# Patient Record
Sex: FEMALE | Race: WHITE | NOT HISPANIC OR LATINO | URBAN - METROPOLITAN AREA
[De-identification: names, ages, dates, MRNs, and addresses within clinical notes are randomized per-mention and may not be internally consistent; named-entity substitution may affect disease eponyms.]

---

## 2022-09-24 ENCOUNTER — INPATIENT (INPATIENT)
Facility: HOSPITAL | Age: 62
LOS: 3 days | Discharge: HOME | DRG: 419 | End: 2022-09-28
Attending: HOSPITALIST | Admitting: INTERNAL MEDICINE
Payer: COMMERCIAL

## 2022-09-24 VITALS
DIASTOLIC BLOOD PRESSURE: 77 MMHG | TEMPERATURE: 98 F | HEIGHT: 70 IN | OXYGEN SATURATION: 98 % | HEART RATE: 84 BPM | RESPIRATION RATE: 24 BRPM | SYSTOLIC BLOOD PRESSURE: 169 MMHG

## 2022-09-24 LAB
ALBUMIN SERPL ELPH-MCNC: 4.7 G/DL — SIGNIFICANT CHANGE UP (ref 3.3–5.2)
ALP SERPL-CCNC: 239 U/L — HIGH (ref 40–120)
ALT FLD-CCNC: 326 U/L — HIGH
ANION GAP SERPL CALC-SCNC: 18 MMOL/L — HIGH (ref 5–17)
APPEARANCE UR: CLEAR — SIGNIFICANT CHANGE UP
AST SERPL-CCNC: 100 U/L — HIGH
BACTERIA # UR AUTO: ABNORMAL
BASOPHILS # BLD AUTO: 0.05 K/UL — SIGNIFICANT CHANGE UP (ref 0–0.2)
BASOPHILS NFR BLD AUTO: 0.4 % — SIGNIFICANT CHANGE UP (ref 0–2)
BILIRUB SERPL-MCNC: 1.9 MG/DL — SIGNIFICANT CHANGE UP (ref 0.4–2)
BILIRUB UR-MCNC: NEGATIVE — SIGNIFICANT CHANGE UP
BLD GP AB SCN SERPL QL: SIGNIFICANT CHANGE UP
BUN SERPL-MCNC: 13.3 MG/DL — SIGNIFICANT CHANGE UP (ref 8–20)
CALCIUM SERPL-MCNC: 10.5 MG/DL — SIGNIFICANT CHANGE UP (ref 8.4–10.5)
CHLORIDE SERPL-SCNC: 100 MMOL/L — SIGNIFICANT CHANGE UP (ref 98–107)
CO2 SERPL-SCNC: 22 MMOL/L — SIGNIFICANT CHANGE UP (ref 22–29)
COLOR SPEC: YELLOW — SIGNIFICANT CHANGE UP
CREAT SERPL-MCNC: 0.88 MG/DL — SIGNIFICANT CHANGE UP (ref 0.5–1.3)
DIFF PNL FLD: ABNORMAL
EGFR: 74 ML/MIN/1.73M2 — SIGNIFICANT CHANGE UP
EOSINOPHIL # BLD AUTO: 0.07 K/UL — SIGNIFICANT CHANGE UP (ref 0–0.5)
EOSINOPHIL NFR BLD AUTO: 0.6 % — SIGNIFICANT CHANGE UP (ref 0–6)
EPI CELLS # UR: SIGNIFICANT CHANGE UP
FLUAV AG NPH QL: SIGNIFICANT CHANGE UP
FLUBV AG NPH QL: SIGNIFICANT CHANGE UP
GLUCOSE SERPL-MCNC: 98 MG/DL — SIGNIFICANT CHANGE UP (ref 70–99)
GLUCOSE UR QL: NEGATIVE MG/DL — SIGNIFICANT CHANGE UP
HCT VFR BLD CALC: 46 % — HIGH (ref 34.5–45)
HGB BLD-MCNC: 15.8 G/DL — HIGH (ref 11.5–15.5)
HIV 1 & 2 AB SERPL IA.RAPID: SIGNIFICANT CHANGE UP
IMM GRANULOCYTES NFR BLD AUTO: 0.4 % — SIGNIFICANT CHANGE UP (ref 0–0.9)
INR BLD: 1.06 RATIO — SIGNIFICANT CHANGE UP (ref 0.88–1.16)
KETONES UR-MCNC: ABNORMAL
LACTATE BLDV-MCNC: 1.2 MMOL/L — SIGNIFICANT CHANGE UP (ref 0.5–2)
LEUKOCYTE ESTERASE UR-ACNC: NEGATIVE — SIGNIFICANT CHANGE UP
LIDOCAIN IGE QN: 52 U/L — HIGH (ref 22–51)
LYMPHOCYTES # BLD AUTO: 1.36 K/UL — SIGNIFICANT CHANGE UP (ref 1–3.3)
LYMPHOCYTES # BLD AUTO: 12 % — LOW (ref 13–44)
MCHC RBC-ENTMCNC: 31.3 PG — SIGNIFICANT CHANGE UP (ref 27–34)
MCHC RBC-ENTMCNC: 34.3 GM/DL — SIGNIFICANT CHANGE UP (ref 32–36)
MCV RBC AUTO: 91.1 FL — SIGNIFICANT CHANGE UP (ref 80–100)
MONOCYTES # BLD AUTO: 1.14 K/UL — HIGH (ref 0–0.9)
MONOCYTES NFR BLD AUTO: 10.1 % — SIGNIFICANT CHANGE UP (ref 2–14)
NEUTROPHILS # BLD AUTO: 8.65 K/UL — HIGH (ref 1.8–7.4)
NEUTROPHILS NFR BLD AUTO: 76.5 % — SIGNIFICANT CHANGE UP (ref 43–77)
NITRITE UR-MCNC: NEGATIVE — SIGNIFICANT CHANGE UP
PH UR: 5 — SIGNIFICANT CHANGE UP (ref 5–8)
PLATELET # BLD AUTO: 238 K/UL — SIGNIFICANT CHANGE UP (ref 150–400)
POTASSIUM SERPL-MCNC: 3.8 MMOL/L — SIGNIFICANT CHANGE UP (ref 3.5–5.3)
POTASSIUM SERPL-SCNC: 3.8 MMOL/L — SIGNIFICANT CHANGE UP (ref 3.5–5.3)
PROT SERPL-MCNC: 8 G/DL — SIGNIFICANT CHANGE UP (ref 6.6–8.7)
PROT UR-MCNC: NEGATIVE — SIGNIFICANT CHANGE UP
PROTHROM AB SERPL-ACNC: 12.3 SEC — SIGNIFICANT CHANGE UP (ref 10.5–13.4)
RBC # BLD: 5.05 M/UL — SIGNIFICANT CHANGE UP (ref 3.8–5.2)
RBC # FLD: 12.8 % — SIGNIFICANT CHANGE UP (ref 10.3–14.5)
RBC CASTS # UR COMP ASSIST: ABNORMAL /HPF (ref 0–4)
RSV RNA NPH QL NAA+NON-PROBE: SIGNIFICANT CHANGE UP
SARS-COV-2 RNA SPEC QL NAA+PROBE: SIGNIFICANT CHANGE UP
SODIUM SERPL-SCNC: 140 MMOL/L — SIGNIFICANT CHANGE UP (ref 135–145)
SP GR SPEC: 1.02 — SIGNIFICANT CHANGE UP (ref 1.01–1.02)
UROBILINOGEN FLD QL: 8 MG/DL
WBC # BLD: 11.31 K/UL — HIGH (ref 3.8–10.5)
WBC # FLD AUTO: 11.31 K/UL — HIGH (ref 3.8–10.5)
WBC UR QL: NEGATIVE /HPF — SIGNIFICANT CHANGE UP (ref 0–5)

## 2022-09-24 PROCEDURE — 99284 EMERGENCY DEPT VISIT MOD MDM: CPT

## 2022-09-24 PROCEDURE — 93010 ELECTROCARDIOGRAM REPORT: CPT

## 2022-09-24 PROCEDURE — 76705 ECHO EXAM OF ABDOMEN: CPT | Mod: 26,59

## 2022-09-24 RX ORDER — HYDROMORPHONE HYDROCHLORIDE 2 MG/ML
0.5 INJECTION INTRAMUSCULAR; INTRAVENOUS; SUBCUTANEOUS
Refills: 0 | Status: DISCONTINUED | OUTPATIENT
Start: 2022-09-24 | End: 2022-09-25

## 2022-09-24 RX ORDER — ACETAMINOPHEN 500 MG
650 TABLET ORAL ONCE
Refills: 0 | Status: COMPLETED | OUTPATIENT
Start: 2022-09-24 | End: 2022-09-24

## 2022-09-24 RX ORDER — SODIUM CHLORIDE 9 MG/ML
1000 INJECTION INTRAMUSCULAR; INTRAVENOUS; SUBCUTANEOUS ONCE
Refills: 0 | Status: COMPLETED | OUTPATIENT
Start: 2022-09-24 | End: 2022-09-24

## 2022-09-24 RX ORDER — MEROPENEM 1 G/30ML
1000 INJECTION INTRAVENOUS ONCE
Refills: 0 | Status: DISCONTINUED | OUTPATIENT
Start: 2022-09-24 | End: 2022-09-24

## 2022-09-24 RX ORDER — MEROPENEM 1 G/30ML
1000 INJECTION INTRAVENOUS ONCE
Refills: 0 | Status: COMPLETED | OUTPATIENT
Start: 2022-09-24 | End: 2022-09-24

## 2022-09-24 RX ORDER — KETOROLAC TROMETHAMINE 30 MG/ML
15 SYRINGE (ML) INJECTION ONCE
Refills: 0 | Status: DISCONTINUED | OUTPATIENT
Start: 2022-09-24 | End: 2022-09-24

## 2022-09-24 RX ORDER — FAMOTIDINE 10 MG/ML
20 INJECTION INTRAVENOUS ONCE
Refills: 0 | Status: COMPLETED | OUTPATIENT
Start: 2022-09-24 | End: 2022-09-24

## 2022-09-24 RX ADMIN — Medication 15 MILLIGRAM(S): at 15:39

## 2022-09-24 RX ADMIN — HYDROMORPHONE HYDROCHLORIDE 0.5 MILLIGRAM(S): 2 INJECTION INTRAMUSCULAR; INTRAVENOUS; SUBCUTANEOUS at 15:45

## 2022-09-24 RX ADMIN — MEROPENEM 1000 MILLIGRAM(S): 1 INJECTION INTRAVENOUS at 23:16

## 2022-09-24 RX ADMIN — Medication 650 MILLIGRAM(S): at 23:47

## 2022-09-24 RX ADMIN — FAMOTIDINE 20 MILLIGRAM(S): 10 INJECTION INTRAVENOUS at 15:39

## 2022-09-24 RX ADMIN — SODIUM CHLORIDE 1000 MILLILITER(S): 9 INJECTION INTRAMUSCULAR; INTRAVENOUS; SUBCUTANEOUS at 15:39

## 2022-09-24 NOTE — ED ADULT NURSE NOTE - OBJECTIVE STATEMENT
AAOx3 c/o left "gallbladder" pain today. Patient grimacing in pain. Denies NVD, HA, CP, SOB. Labs sent, IV in place. Meds given.

## 2022-09-24 NOTE — ED ADULT TRIAGE NOTE - CHIEF COMPLAINT QUOTE
62F nad c/o epigastric radiating to back onset 1200 today, states "I am having a gallbladder attacK"

## 2022-09-24 NOTE — ED STATDOCS - PROGRESS NOTE DETAILS
LORENA Randolph: pt reporting improvement in pain after meds. Pain similar to previous "gallbladder attacks". had similar episode 1 week ago, not as severe as today which self resolved after a few hours. LFTs elevated, pending ultrasound read. LORENA Randolph: dilated CBD to 13mm with multiple stones in duct. GI c/s placed. surgery consulted. pain still controlled. Received sign out from LORENA Randolph. Labs and US discussed with pt. Sx saw pt who want admission to medicine, GI will see in morning to see if want to intervene. Pt reassessed. Pain well controlled. Spoke with medicine, will order CT abd/pelvis, abx, and blood cultures. CT results explained to pt. Will admit to medicine for further workup and management.

## 2022-09-24 NOTE — ED STATDOCS - NS_ ATTENDINGSCRIBEDETAILS _ED_A_ED_FT
I, Oscar Scales, performed the initial face to face bedside interview with this patient regarding history of present illness, review of symptoms and relevant past medical, social and family history.  I completed an independent physical examination.  I was the initial provider who evaluated this patient.  The history, relevant review of systems, past medical and surgical history, medical decision making, and physical examination was documented by the scribe in my presence and I attest to the accuracy of the documentation.  I have signed out the follow up of any pending tests (i.e. labs, radiological studies) to the ACP.  I have communicated the patient’s plan of care and disposition with the ACP.

## 2022-09-24 NOTE — ED STATDOCS - OBJECTIVE STATEMENT
63 y/o female with pmhx of biliary colic, c/o sudden onset of upper abdominal pain radiating to both sides of abdomen to back. States pain has been waning and waxing the past 3 days and become worse today which caused her to come to ED. Pt has had nausea. Denies vomiting, fever or chills. Denies trauma. No other symptoms. 63 y/o female with pmhx of biliary colic for the past 3 years ( usually occurs once a year), now c/o sudden onset of upper abdominal pain radiating to both sides of abdomen to back. States pain has been waning and waxing the past 3 days and become worse today which caused her to come to ED. Pt has had nausea. Denies vomiting, fever or chills. Denies trauma. No other symptoms.

## 2022-09-24 NOTE — ED STATDOCS - CLINICAL SUMMARY MEDICAL DECISION MAKING FREE TEXT BOX
Will evaluate for acute biliary colic due to hx of colic. Will obtain US, labs, give pain medication and IV fluids.

## 2022-09-24 NOTE — ED ADULT NURSE REASSESSMENT NOTE - NS ED NURSE REASSESS COMMENT FT1
Assumed care of pt from previous RN. A&Ox4, RR even and unlabored. Skin is warm an dry. PT reports no complaints at this time. Pending GI consult. Updated on plan of care.

## 2022-09-25 ENCOUNTER — TRANSCRIPTION ENCOUNTER (OUTPATIENT)
Age: 62
End: 2022-09-25

## 2022-09-25 DIAGNOSIS — K80.50 CALCULUS OF BILE DUCT WITHOUT CHOLANGITIS OR CHOLECYSTITIS WITHOUT OBSTRUCTION: ICD-10-CM

## 2022-09-25 DIAGNOSIS — Z98.890 OTHER SPECIFIED POSTPROCEDURAL STATES: Chronic | ICD-10-CM

## 2022-09-25 LAB
-  STREPTOCOCCUS SP. (NOT GRP A, B OR S PNEUMONIAE): SIGNIFICANT CHANGE UP
ALBUMIN SERPL ELPH-MCNC: 3.7 G/DL — SIGNIFICANT CHANGE UP (ref 3.3–5.2)
ALP SERPL-CCNC: 216 U/L — HIGH (ref 40–120)
ALT FLD-CCNC: 236 U/L — HIGH
ANION GAP SERPL CALC-SCNC: 16 MMOL/L — SIGNIFICANT CHANGE UP (ref 5–17)
AST SERPL-CCNC: 66 U/L — HIGH
BILIRUB SERPL-MCNC: 0.7 MG/DL — SIGNIFICANT CHANGE UP (ref 0.4–2)
BUN SERPL-MCNC: 14.3 MG/DL — SIGNIFICANT CHANGE UP (ref 8–20)
CALCIUM SERPL-MCNC: 9.2 MG/DL — SIGNIFICANT CHANGE UP (ref 8.4–10.5)
CHLORIDE SERPL-SCNC: 104 MMOL/L — SIGNIFICANT CHANGE UP (ref 98–107)
CO2 SERPL-SCNC: 20 MMOL/L — LOW (ref 22–29)
CREAT SERPL-MCNC: 0.72 MG/DL — SIGNIFICANT CHANGE UP (ref 0.5–1.3)
EGFR: 94 ML/MIN/1.73M2 — SIGNIFICANT CHANGE UP
GLUCOSE SERPL-MCNC: 59 MG/DL — LOW (ref 70–99)
GRAM STN FLD: SIGNIFICANT CHANGE UP
HCT VFR BLD CALC: 40.4 % — SIGNIFICANT CHANGE UP (ref 34.5–45)
HGB BLD-MCNC: 13.6 G/DL — SIGNIFICANT CHANGE UP (ref 11.5–15.5)
MCHC RBC-ENTMCNC: 31.1 PG — SIGNIFICANT CHANGE UP (ref 27–34)
MCHC RBC-ENTMCNC: 33.7 GM/DL — SIGNIFICANT CHANGE UP (ref 32–36)
MCV RBC AUTO: 92.4 FL — SIGNIFICANT CHANGE UP (ref 80–100)
METHOD TYPE: SIGNIFICANT CHANGE UP
PLATELET # BLD AUTO: 194 K/UL — SIGNIFICANT CHANGE UP (ref 150–400)
POTASSIUM SERPL-MCNC: 3.5 MMOL/L — SIGNIFICANT CHANGE UP (ref 3.5–5.3)
POTASSIUM SERPL-SCNC: 3.5 MMOL/L — SIGNIFICANT CHANGE UP (ref 3.5–5.3)
PROT SERPL-MCNC: 6.3 G/DL — LOW (ref 6.6–8.7)
RBC # BLD: 4.37 M/UL — SIGNIFICANT CHANGE UP (ref 3.8–5.2)
RBC # FLD: 12.8 % — SIGNIFICANT CHANGE UP (ref 10.3–14.5)
SODIUM SERPL-SCNC: 140 MMOL/L — SIGNIFICANT CHANGE UP (ref 135–145)
SPECIMEN SOURCE: SIGNIFICANT CHANGE UP
WBC # BLD: 6.29 K/UL — SIGNIFICANT CHANGE UP (ref 3.8–10.5)
WBC # FLD AUTO: 6.29 K/UL — SIGNIFICANT CHANGE UP (ref 3.8–10.5)

## 2022-09-25 PROCEDURE — 74177 CT ABD & PELVIS W/CONTRAST: CPT | Mod: 26,MA

## 2022-09-25 PROCEDURE — 12345: CPT | Mod: NC

## 2022-09-25 PROCEDURE — 99223 1ST HOSP IP/OBS HIGH 75: CPT

## 2022-09-25 RX ORDER — METOPROLOL TARTRATE 50 MG
50 TABLET ORAL DAILY
Refills: 0 | Status: DISCONTINUED | OUTPATIENT
Start: 2022-09-25 | End: 2022-09-27

## 2022-09-25 RX ORDER — AMLODIPINE BESYLATE 2.5 MG/1
1 TABLET ORAL
Qty: 0 | Refills: 0 | DISCHARGE

## 2022-09-25 RX ORDER — ONDANSETRON 8 MG/1
4 TABLET, FILM COATED ORAL EVERY 8 HOURS
Refills: 0 | Status: DISCONTINUED | OUTPATIENT
Start: 2022-09-25 | End: 2022-09-27

## 2022-09-25 RX ORDER — METOPROLOL TARTRATE 50 MG
1 TABLET ORAL
Qty: 0 | Refills: 0 | DISCHARGE

## 2022-09-25 RX ORDER — PANTOPRAZOLE SODIUM 20 MG/1
40 TABLET, DELAYED RELEASE ORAL
Refills: 0 | Status: DISCONTINUED | OUTPATIENT
Start: 2022-09-25 | End: 2022-09-27

## 2022-09-25 RX ORDER — AMLODIPINE BESYLATE 2.5 MG/1
2.5 TABLET ORAL DAILY
Refills: 0 | Status: DISCONTINUED | OUTPATIENT
Start: 2022-09-25 | End: 2022-09-27

## 2022-09-25 RX ORDER — ASPIRIN/CALCIUM CARB/MAGNESIUM 324 MG
1 TABLET ORAL
Qty: 0 | Refills: 0 | DISCHARGE

## 2022-09-25 RX ORDER — HYDROMORPHONE HYDROCHLORIDE 2 MG/ML
0.5 INJECTION INTRAMUSCULAR; INTRAVENOUS; SUBCUTANEOUS EVERY 4 HOURS
Refills: 0 | Status: DISCONTINUED | OUTPATIENT
Start: 2022-09-25 | End: 2022-09-27

## 2022-09-25 RX ORDER — MEROPENEM 1 G/30ML
1000 INJECTION INTRAVENOUS EVERY 8 HOURS
Refills: 0 | Status: DISCONTINUED | OUTPATIENT
Start: 2022-09-25 | End: 2022-09-25

## 2022-09-25 RX ORDER — LANOLIN ALCOHOL/MO/W.PET/CERES
3 CREAM (GRAM) TOPICAL AT BEDTIME
Refills: 0 | Status: DISCONTINUED | OUTPATIENT
Start: 2022-09-25 | End: 2022-09-27

## 2022-09-25 RX ORDER — MEROPENEM 1 G/30ML
1000 INJECTION INTRAVENOUS EVERY 8 HOURS
Refills: 0 | Status: DISCONTINUED | OUTPATIENT
Start: 2022-09-25 | End: 2022-09-27

## 2022-09-25 RX ORDER — CHOLECALCIFEROL (VITAMIN D3) 125 MCG
2000 CAPSULE ORAL DAILY
Refills: 0 | Status: DISCONTINUED | OUTPATIENT
Start: 2022-09-25 | End: 2022-09-27

## 2022-09-25 RX ORDER — ACETAMINOPHEN 500 MG
650 TABLET ORAL EVERY 6 HOURS
Refills: 0 | Status: DISCONTINUED | OUTPATIENT
Start: 2022-09-25 | End: 2022-09-27

## 2022-09-25 RX ORDER — ASPIRIN/CALCIUM CARB/MAGNESIUM 324 MG
81 TABLET ORAL DAILY
Refills: 0 | Status: DISCONTINUED | OUTPATIENT
Start: 2022-09-25 | End: 2022-09-27

## 2022-09-25 RX ORDER — INDOMETHACIN 50 MG
100 CAPSULE ORAL ONCE
Refills: 0 | Status: DISCONTINUED | OUTPATIENT
Start: 2022-09-25 | End: 2022-09-27

## 2022-09-25 RX ADMIN — Medication 650 MILLIGRAM(S): at 12:55

## 2022-09-25 RX ADMIN — Medication 650 MILLIGRAM(S): at 11:55

## 2022-09-25 RX ADMIN — Medication 2000 UNIT(S): at 11:55

## 2022-09-25 RX ADMIN — PANTOPRAZOLE SODIUM 40 MILLIGRAM(S): 20 TABLET, DELAYED RELEASE ORAL at 06:14

## 2022-09-25 RX ADMIN — MEROPENEM 1000 MILLIGRAM(S): 1 INJECTION INTRAVENOUS at 13:06

## 2022-09-25 RX ADMIN — MEROPENEM 1000 MILLIGRAM(S): 1 INJECTION INTRAVENOUS at 21:40

## 2022-09-25 RX ADMIN — Medication 81 MILLIGRAM(S): at 11:55

## 2022-09-25 RX ADMIN — HYDROMORPHONE HYDROCHLORIDE 0.5 MILLIGRAM(S): 2 INJECTION INTRAMUSCULAR; INTRAVENOUS; SUBCUTANEOUS at 14:58

## 2022-09-25 RX ADMIN — HYDROMORPHONE HYDROCHLORIDE 0.5 MILLIGRAM(S): 2 INJECTION INTRAMUSCULAR; INTRAVENOUS; SUBCUTANEOUS at 16:04

## 2022-09-25 RX ADMIN — AMLODIPINE BESYLATE 2.5 MILLIGRAM(S): 2.5 TABLET ORAL at 06:14

## 2022-09-25 RX ADMIN — MEROPENEM 1000 MILLIGRAM(S): 1 INJECTION INTRAVENOUS at 09:40

## 2022-09-25 RX ADMIN — Medication 50 MILLIGRAM(S): at 06:14

## 2022-09-25 RX ADMIN — HYDROMORPHONE HYDROCHLORIDE 0.5 MILLIGRAM(S): 2 INJECTION INTRAMUSCULAR; INTRAVENOUS; SUBCUTANEOUS at 18:56

## 2022-09-25 NOTE — H&P ADULT - HISTORY OF PRESENT ILLNESS
61 y/o female with PMH of HTN, GERD came to the ED because "my gall bladder decided to act up again for the third time". Patient reported RUQ abdominal pain x 1 week radiating to the back, associated with nausea; saying it has been on and off but yesterday it was constant and progressively worsened. She noted first episode in 2020; saw her PCP who also is a GI at the time, was told she had gall stone but not need to take out the gall bladder. Last year during thanksgiving she had another attack, did not seek any medical advised. She has no vomiting, fever, chills, chest pain, shortness of  breath, recent travel, sick contact, diarrhea, dysuria.

## 2022-09-25 NOTE — H&P ADULT - NSHPPHYSICALEXAM_GEN_ALL_CORE
Vital Signs Last 24 Hrs  T(C): 36.4 (25 Sep 2022 04:08), Max: 36.9 (24 Sep 2022 18:47)  T(F): 97.6 (25 Sep 2022 04:08), Max: 98.4 (24 Sep 2022 18:47)  HR: 60 (25 Sep 2022 04:08) (57 - 88)  BP: 131/67 (25 Sep 2022 04:08) (121/61 - 169/77)  BP(mean): --  RR: 18 (25 Sep 2022 04:08) (18 - 24)  SpO2: 97% (25 Sep 2022 04:08) (97% - 98%)    Parameters below as of 25 Sep 2022 04:08  Patient On (Oxygen Delivery Method): room air

## 2022-09-25 NOTE — PATIENT PROFILE ADULT - FALL HARM RISK - UNIVERSAL INTERVENTIONS
Bed in lowest position, wheels locked, appropriate side rails in place/Call bell, personal items and telephone in reach/Instruct patient to call for assistance before getting out of bed or chair/Non-slip footwear when patient is out of bed/[place] to call system/Physically safe environment - no spills, clutter or unnecessary equipment/Purposeful Proactive Rounding/Room/bathroom lighting operational, light cord in reach

## 2022-09-25 NOTE — H&P ADULT - ASSESSMENT
63 y/o female with PMH of HTN, GERD came to the ED because "my gall bladder decided to act up again for the third time". Patient reported RUQ abdominal pain x 1 week radiating to the back, associated with nausea; saying it has been on and off but yesterday it was constant and progressively worsened. She noted first episode in 2020; saw her PCP who also is a GI at the time, was told she had gall stone but not need to take out the gall bladder. Last year during thanksgiving she had another attack, did not seek any medical advised.   CT abdomen/pelvis: Choledocholithiasis, with a 2 mm calculus in the common bile duct, which is dilated up to 1.2 cm and abruptly narrows. Cholelithiasis with mild gallbladder wall thickening/edema. US abdomen: Multiple calculi in dilated gallbladder. No gallbladder wall thickening. Trace pericholecystic fluid. Prior pain medication precludes accurate assessment for tenderness. Calculi in distal aspect of dilated common bile duct.    Choledocholithiasis/cholelithiasis with elevated LFT/alk phos   Admit to medical floor   Alk phos: 239; AST: 100; ALT: 326  US abdomen/CT abdomen as noted above   Patient said pain now is subsided with medication   GI consulted   As per ED, surgery also consulted   Meropenem once given in the ED, will hold off.   Patient  with RUQ pain but no fever/jaundice, will however continue Meropenem pending possible procedure   Trend LFT     Leukocytosis   WBC: 11.31 no left shift  Likely reactive   Blood culture sent, follow up   Continue antibiotic   Trend WBC     HTN  Metoprolol ER 50mg with holding parameters   Amlodipine 2.5mg     GERD   Omeprazole 40mg     Supportive   DVT prophylaxis: CSD/ambulate as tolerated   Diet: NPO pending GI eval     Plan of care discussed with patient

## 2022-09-25 NOTE — H&P ADULT - NSICDXPASTMEDICALHX_GEN_ALL_CORE_FT
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

## 2022-09-26 ENCOUNTER — TRANSCRIPTION ENCOUNTER (OUTPATIENT)
Age: 62
End: 2022-09-26

## 2022-09-26 LAB
ALBUMIN SERPL ELPH-MCNC: 3.7 G/DL — SIGNIFICANT CHANGE UP (ref 3.3–5.2)
ALP SERPL-CCNC: 198 U/L — HIGH (ref 40–120)
ALT FLD-CCNC: 177 U/L — HIGH
ANION GAP SERPL CALC-SCNC: 13 MMOL/L — SIGNIFICANT CHANGE UP (ref 5–17)
AST SERPL-CCNC: 42 U/L — HIGH
BASOPHILS # BLD AUTO: 0.03 K/UL — SIGNIFICANT CHANGE UP (ref 0–0.2)
BASOPHILS NFR BLD AUTO: 0.3 % — SIGNIFICANT CHANGE UP (ref 0–2)
BILIRUB SERPL-MCNC: 0.6 MG/DL — SIGNIFICANT CHANGE UP (ref 0.4–2)
BLD GP AB SCN SERPL QL: SIGNIFICANT CHANGE UP
BUN SERPL-MCNC: 14.7 MG/DL — SIGNIFICANT CHANGE UP (ref 8–20)
CALCIUM SERPL-MCNC: 9.4 MG/DL — SIGNIFICANT CHANGE UP (ref 8.4–10.5)
CHLORIDE SERPL-SCNC: 103 MMOL/L — SIGNIFICANT CHANGE UP (ref 98–107)
CO2 SERPL-SCNC: 24 MMOL/L — SIGNIFICANT CHANGE UP (ref 22–29)
CREAT SERPL-MCNC: 0.79 MG/DL — SIGNIFICANT CHANGE UP (ref 0.5–1.3)
CULTURE RESULTS: SIGNIFICANT CHANGE UP
EGFR: 85 ML/MIN/1.73M2 — SIGNIFICANT CHANGE UP
EOSINOPHIL # BLD AUTO: 0.03 K/UL — SIGNIFICANT CHANGE UP (ref 0–0.5)
EOSINOPHIL NFR BLD AUTO: 0.3 % — SIGNIFICANT CHANGE UP (ref 0–6)
GLUCOSE SERPL-MCNC: 85 MG/DL — SIGNIFICANT CHANGE UP (ref 70–99)
GRAM STN FLD: SIGNIFICANT CHANGE UP
HCT VFR BLD CALC: 41.2 % — SIGNIFICANT CHANGE UP (ref 34.5–45)
HCV AB S/CO SERPL IA: 0.13 S/CO — SIGNIFICANT CHANGE UP (ref 0–0.99)
HCV AB SERPL-IMP: SIGNIFICANT CHANGE UP
HGB BLD-MCNC: 14.5 G/DL — SIGNIFICANT CHANGE UP (ref 11.5–15.5)
IMM GRANULOCYTES NFR BLD AUTO: 0.2 % — SIGNIFICANT CHANGE UP (ref 0–0.9)
LYMPHOCYTES # BLD AUTO: 1.1 K/UL — SIGNIFICANT CHANGE UP (ref 1–3.3)
LYMPHOCYTES # BLD AUTO: 10.8 % — LOW (ref 13–44)
MCHC RBC-ENTMCNC: 31.7 PG — SIGNIFICANT CHANGE UP (ref 27–34)
MCHC RBC-ENTMCNC: 35.2 GM/DL — SIGNIFICANT CHANGE UP (ref 32–36)
MCV RBC AUTO: 90.2 FL — SIGNIFICANT CHANGE UP (ref 80–100)
MONOCYTES # BLD AUTO: 1.26 K/UL — HIGH (ref 0–0.9)
MONOCYTES NFR BLD AUTO: 12.4 % — SIGNIFICANT CHANGE UP (ref 2–14)
NEUTROPHILS # BLD AUTO: 7.76 K/UL — HIGH (ref 1.8–7.4)
NEUTROPHILS NFR BLD AUTO: 76 % — SIGNIFICANT CHANGE UP (ref 43–77)
ORGANISM # SPEC MICROSCOPIC CNT: SIGNIFICANT CHANGE UP
ORGANISM # SPEC MICROSCOPIC CNT: SIGNIFICANT CHANGE UP
PLATELET # BLD AUTO: 229 K/UL — SIGNIFICANT CHANGE UP (ref 150–400)
POTASSIUM SERPL-MCNC: 3.5 MMOL/L — SIGNIFICANT CHANGE UP (ref 3.5–5.3)
POTASSIUM SERPL-SCNC: 3.5 MMOL/L — SIGNIFICANT CHANGE UP (ref 3.5–5.3)
PROT SERPL-MCNC: 6.4 G/DL — LOW (ref 6.6–8.7)
RBC # BLD: 4.57 M/UL — SIGNIFICANT CHANGE UP (ref 3.8–5.2)
RBC # FLD: 12.8 % — SIGNIFICANT CHANGE UP (ref 10.3–14.5)
SARS-COV-2 RNA SPEC QL NAA+PROBE: SIGNIFICANT CHANGE UP
SODIUM SERPL-SCNC: 139 MMOL/L — SIGNIFICANT CHANGE UP (ref 135–145)
SPECIMEN SOURCE: SIGNIFICANT CHANGE UP
WBC # BLD: 10.2 K/UL — SIGNIFICANT CHANGE UP (ref 3.8–10.5)
WBC # FLD AUTO: 10.2 K/UL — SIGNIFICANT CHANGE UP (ref 3.8–10.5)

## 2022-09-26 PROCEDURE — 43264 ERCP REMOVE DUCT CALCULI: CPT

## 2022-09-26 PROCEDURE — 99233 SBSQ HOSP IP/OBS HIGH 50: CPT

## 2022-09-26 PROCEDURE — 99223 1ST HOSP IP/OBS HIGH 75: CPT

## 2022-09-26 DEVICE — CATH BLLN BIL RX 9-12CM
Type: IMPLANTABLE DEVICE | Status: NON-FUNCTIONAL
Removed: 2022-09-26

## 2022-09-26 DEVICE — GWIRE JAGTOME REVOLUTION RX 260CM/0.025IN
Type: IMPLANTABLE DEVICE | Status: NON-FUNCTIONAL
Removed: 2022-09-26

## 2022-09-26 RX ORDER — VANCOMYCIN HCL 1 G
1000 VIAL (EA) INTRAVENOUS EVERY 12 HOURS
Refills: 0 | Status: DISCONTINUED | OUTPATIENT
Start: 2022-09-26 | End: 2022-09-27

## 2022-09-26 RX ADMIN — MEROPENEM 1000 MILLIGRAM(S): 1 INJECTION INTRAVENOUS at 17:00

## 2022-09-26 RX ADMIN — Medication 2000 UNIT(S): at 14:59

## 2022-09-26 RX ADMIN — Medication 50 MILLIGRAM(S): at 06:30

## 2022-09-26 RX ADMIN — Medication 250 MILLIGRAM(S): at 15:02

## 2022-09-26 RX ADMIN — AMLODIPINE BESYLATE 2.5 MILLIGRAM(S): 2.5 TABLET ORAL at 06:30

## 2022-09-26 RX ADMIN — MEROPENEM 1000 MILLIGRAM(S): 1 INJECTION INTRAVENOUS at 06:31

## 2022-09-26 RX ADMIN — PANTOPRAZOLE SODIUM 40 MILLIGRAM(S): 20 TABLET, DELAYED RELEASE ORAL at 06:30

## 2022-09-26 RX ADMIN — MEROPENEM 1000 MILLIGRAM(S): 1 INJECTION INTRAVENOUS at 21:36

## 2022-09-26 RX ADMIN — Medication 81 MILLIGRAM(S): at 14:59

## 2022-09-26 NOTE — CONSULT NOTE ADULT - SUBJECTIVE AND OBJECTIVE BOX
ACUTE CARE SURGERY CONSULT     HPI: 62y Female present to ED with abdominal pain, midepigastrium and RUQ, since 12 pm, after a meal, nausea no vomiting, does not radiate, 7/10, has had similar epiosdes in past but not so severe which resolved on their own, last one 5 days ago          ROS: 10-system review is otherwise negative except HPI above.      PAST MEDICAL & SURGICAL HISTORY:  Medina&#x27;s esophagus      GERD (gastroesophageal reflux disease)      Former smoker      Hypertension      Mitral regurgitation  suggestive of a torn chordae      Tricuspid regurgitation      S/P MVR (mitral valve repair)        FAMILY HISTORY:  FH: HTN (hypertension) (Mother)      Family history not pertinent as reviewed with the patient.    SOCIAL HISTORY:  Denies any toxic habits    ALLERGIES: NKA penicillin (Hives)  sulfa drugs (Hives)      HOME MEDICATIONS: ***  Home Medications:  amLODIPine 2.5 mg oral tablet: 1 tab(s) orally once a day (25 Sep 2022 03:55)  aspirin 81 mg oral tablet, chewable: 1 tab(s) orally once a day (25 Sep 2022 03:56)  metoprolol succinate 50 mg oral tablet, extended release: 1 tab(s) orally once a day (25 Sep 2022 03:55)  omeprazole 40 mg oral delayed release capsule: 1 cap(s) orally once a day (28 Mar 2016 07:40)  Vitamin D3 2000 intl units oral tablet: 1 tab(s) orally once a day (28 Mar 2016 07:40)      --------------------------------------------------------------------------------------------    PHYSICAL EXAM:   General: NAD, Lying in bed comfortably  Neuro: A+Ox3  HEENT: EOMI, PERRLA, MMM  Cardio: RRR  Resp: Non labored breathing on RA  GI/Abd: Soft, NT/ND, no rebound/guarding, no masses palpated  Vascular: All 4 extremities warm and well perfused.   Pelvis: stable  Musculoskeletal: All 4 extremities moving spontaneously, no limitations, no spinal tenderness.  --------------------------------------------------------------------------------------------    LABS                 15.8   11.31  )----------(  238       ( 24 Sep 2022 15:41 )               46.0      140    |  100    |  13.3   ----------------------------<  98         ( 24 Sep 2022 15:41 )  3.8     |  22.0   |  0.88     Ca    10.5       ( 24 Sep 2022 15:41 )    TPro  8.0    /  Alb  4.7    /  TBili  1.9    /  DBili  x      /  AST  100    /  ALT  326    /  AlkPhos  239    ( 24 Sep 2022 15:41 )    LIVER FUNCTIONS - ( 24 Sep 2022 15:41 )  Alb: 4.7 g/dL / Pro: 8.0 g/dL / ALK PHOS: 239 U/L / ALT: 326 U/L / AST: 100 U/L / GGT: x           PT/INR -  12.3 sec / 1.06 ratio   ( 24 Sep 2022 15:52 )         CAPILLARY BLOOD GLUCOSE        Urinalysis Basic - ( 24 Sep 2022 18:30 )    Color: Yellow / Appearance: Clear / S.020 / pH: x  Gluc: x / Ketone: Large  / Bili: Negative / Urobili: 8 mg/dL   Blood: x / Protein: Negative / Nitrite: Negative   Leuk Esterase: Negative / RBC: 3-5 /HPF / WBC Negative /HPF   Sq Epi: x / Non Sq Epi: Occasional / Bacteria: Occasional        15:42 - VBG - pH:       | pCO2:       | pO2:       | Lactate: 1.20     --------------------------------------------------------------------------------------------  IMAGING  < from: US Abdomen Upper Quadrant Right (22 @ 17:47) >  IMPRESSION:    Multiple calculi in dilated gallbladder. No gallbladder wall thickening.   Trace pericholecystic fluid. Prior pain medication precludes accurate   assessment for tenderness.    Calculi in distal aspect of dilated common bile duct.    --- End of Report ---      < end of copied text >  ***    ASSESSMENT: Patient is a 62y old female with choledocholithaisis, possible cholecystitis, however not really tender, stones visualized in US in CBD, GI consulted, patient will need ERCP, and subsequent cholecystectomy once bile duct is cleared, hypertensive and history of MVR however since then active not on any blood thinners.    PLAN:    - agree GI eval  - will need cholecystectomy once CBD cleared  - NPO  - IVF  - COVID  - Will continue to follow
Patient is a 62y old  Female who presents with a chief complaint of abdominal pain.      HPI:  61 y/o female with PMH of HTN, GERD came to the ED because "my gall bladder decided to act up again for the third time". Patient reported RUQ abdominal pain x 1 week radiating to the back, associated with nausea; saying it has been on and off but yesterday it was constant and progressively worsened. She noted first episode in 2020; saw her PCP who also is a GI at the time, was told she had gall stones but not need to take out the gall bladder. Last year during thanksgiving she had another attack, did not seek any medical advised. She has no vomiting, fever, chills, chest pain, shortness of  breath, recent travel, sick contact, diarrhea, dysuria. CT here shows cholelithiasis and choledocholithiasis. Pt. has had multiple EGD's in the past for Medina's dysplasia surveillance but has had no prior colonoscopies.      REVIEW OF SYSTEMS:  Constitutional: No fever, weight loss or fatigue  ENMT:  No difficulty hearing, tinnitus, vertigo; No sinus or throat pain  Respiratory: No cough, wheezing, chills or hemoptysis  Cardiovascular: No chest pain, palpitations, dizziness or leg swelling  Gastrointestinal: As per HPI.  Skin: No itching, burning, rashes or lesions   Musculoskeletal: No joint pain or swelling; No muscle, back or extremity pain  Patient has no cardiopulmonary, peripheral vascular, musculoskeletal, dermatological, neurological, gynecological or psychological symptoms or complaints at this time.    PAST MEDICAL & SURGICAL HISTORY:  Medina's esophagus      GERD (gastroesophageal reflux disease)      Former smoker      Hypertension      Mitral regurgitation  suggestive of a torn chordae      Tricuspid regurgitation      S/P MVR (mitral valve repair)          FAMILY HISTORY:  FH: HTN (hypertension) (Mother)        SOCIAL HISTORY:  Smoking Status: [ ] Current, [ ] Former, [ x] Never  Pack Years: N/A. No ETOH or drug abuse history.    MEDICATIONS:  MEDICATIONS  (STANDING):  amLODIPine   Tablet 2.5 milliGRAM(s) Oral daily  aspirin  chewable 81 milliGRAM(s) Oral daily  cholecalciferol 2000 Unit(s) Oral daily  meropenem Injectable 1000 milliGRAM(s) IV Push every 8 hours  metoprolol succinate ER 50 milliGRAM(s) Oral daily  pantoprazole    Tablet 40 milliGRAM(s) Oral before breakfast    MEDICATIONS  (PRN):  acetaminophen     Tablet .. 650 milliGRAM(s) Oral every 6 hours PRN Temp greater or equal to 38C (100.4F), Mild Pain (1 - 3)  aluminum hydroxide/magnesium hydroxide/simethicone Suspension 30 milliLiter(s) Oral every 4 hours PRN Dyspepsia  HYDROmorphone  Injectable 0.5 milliGRAM(s) IV Push every 2 hours PRN Pain  melatonin 3 milliGRAM(s) Oral at bedtime PRN Insomnia  ondansetron Injectable 4 milliGRAM(s) IV Push every 8 hours PRN Nausea and/or Vomiting      Allergies    penicillin (Hives)  sulfa drugs (Hives)    Intolerances        Vital Signs Last 24 Hrs  T(C): 36.4 (25 Sep 2022 04:08), Max: 36.9 (24 Sep 2022 18:47)  T(F): 97.6 (25 Sep 2022 04:08), Max: 98.4 (24 Sep 2022 18:47)  HR: 60 (25 Sep 2022 04:08) (57 - 88)  BP: 131/67 (25 Sep 2022 04:08) (121/61 - 169/77)  BP(mean): --  RR: 18 (25 Sep 2022 04:08) (18 - 24)  SpO2: 97% (25 Sep 2022 04:08) (97% - 98%)    Parameters below as of 25 Sep 2022 04:08  Patient On (Oxygen Delivery Method): room air            PHYSICAL EXAM:    General: Well developed; in no acute distress  HEENT: MMM, conjunctiva pink and sclera anicteric.  Lungs: Clear bilaterally  Cor: RRR S1, S2 only  Gastrointestinal: Abdomen: Soft, mild epigastric tenderness, non-distended; Normal bowel sounds; No rebound or guarding or HSM.  Extremities: Normal range of motion, No clubbing, cyanosis or edema  Neurological: Alert and oriented x3  Skin: Warm and dry. No obvious rash      LABS:                        13.6   6.29  )-----------( 194      ( 25 Sep 2022 05:35 )             40.4     09-25    140  |  104  |  14.3  ----------------------------<  59<L>  3.5   |  20.0<L>  |  0.72    Ca    9.2      25 Sep 2022 05:35    TPro  6.3<L>  /  Alb  3.7  /  TBili  0.7  /  DBili  x   /  AST  66<H>  /  ALT  236<H>  /  AlkPhos  216<H>  09-25          RADIOLOGY & ADDITIONAL STUDIES:   < from: CT Abdomen and Pelvis w/ IV Cont (09.25.22 @ 01:20) >  AM:  CT ABDOMEN AND PELVIS IC                          PROCEDURE DATE:  09/25/2022          INTERPRETATION:  CLINICAL INFORMATION: 62 years old. Female. ruq pain,   r/o choledocho.    COMPARISON: None.    CONTRAST/COMPLICATIONS:  IVContrast: Omnipaque 350  93 cc administered  7 cc discarded.  Oral Contrast: NONE  Complications: None reported at time of study completion    PROCEDURE:  CT of the Abdomen and Pelvis was performed.  Sagittal and coronal reformats were performed.    FINDINGS:    LOWER CHEST: Within normal limits.    LIVER: Multiple subcentimeter hypodensities, too small to characterize.  BILE DUCTS: Choledocholithiasis, with a 2 mm calculus in the common bile   duct, which is dilated up to 1.2 cm and abruptly narrows.  GALLBLADDER: Tiny calcified gallstones. Mild gallbladder wall   thickening/edema.  SPLEEN: Within normal limits.  PANCREAS: Within normal limits.  ADRENALS: Within normal limits.  KIDNEYS/URETERS: Enhance symmetrically. No hydronephrosis. Left renal   cyst and subcentimeter hypodensities in the right kidney, too small to   characterize.    BLADDER: Within normal limits.  REPRODUCTIVE ORGANS: Calcified fibroid.    BOWEL: No bowel obstruction. Appendix is not definitively visualized.  PERITONEUM: No ascites.  VESSELS: Normal caliber abdominal aorta. Atherosclerosis.  RETROPERITONEUM/LYMPH NODES: No lymphadenopathy.  ABDOMINAL WALL: Within normal limits.  BONES: Degenerative changes in the spine.    IMPRESSION:    Choledocholithiasis, witha 2 mm calculus in the common bile duct, which   is dilated up to 1.2 cm and abruptly narrows.    Cholelithiasis with mild gallbladder wall thickening/edema.    --- End of Report ---            STAN CURTIS MD; Attending Radiologist  This document has been electronically signed. Sep 25 2022  2:25AM    < end of copied text >  
[facility]  INFECTIOUS DISEASES at [place] and [place]  =======================================================                               Kenny Arroyo MD#   Bryson Gomez MD*                             Elly Pitt MD*   Rebecca Carney MD*            Diplomates American Board of Internal Medicine & Infectious Diseases                # [place] Office - Appt - Tel  [phone] Fax [phone]                * [place] Office - Appt - Tel [phone] Fax [phone]                                  Hospital Consult line:  [phone]  =======================================================      [id number]  BOGDAN HENDRICKS    CC: Patient is a 62y old  Female who presents with a chief complaint of Choledocholithiasis (26 Sep 2022 09:58)      62y  Female with h/o HTN, GERD. Patient came to the ED for abdominal pain in the RUQ  radiating to the back. Started on 9/25 after eating associated with nausea, chills and sweats. Had prior episodes and had been having  intermittent RUQ abdominal pain for the week prior. She noted first episode in 2020; saw her PCP and was told she had gall stone but not need to take out the gall bladder. In 2021 during thanksgiving she had another attack, did not seek any medical attention. In the ER patient was afebrile with leukocytosis to 11k. CT A/P reporting Choledocholithiasis. Started on Meropenem. Blood cultures with Streptococcus. s/p ERCP 9/26. ID input requested.         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  No earache, sore throat or runny nose.  CARDIOVASCULAR:  No chest pain  RESPIRATORY:  No cough, shortness of breath  GASTROINTESTINAL:  No nausea, vomiting or diarrhea.  GENITOURINARY:  No dysuria, frequency or urgency. No Blood in urine  MUSCULOSKELETAL:  no joint aches, no muscle pain  SKIN:  No change in skin, hair or nails.  NEUROLOGIC:  No Headaches, seizures  PSYCHIATRIC:  No disorder of thought or mood.  ENDOCRINE:  No heat or cold intolerance  HEMATOLOGICAL:  No easy bruising or bleeding.       Physical Exam:  GEN: NAD  HEENT: normocephalic and atraumatic. EOMI. PERRL.    NECK: Supple.   LUNGS: CTA B/L.  HEART: RRR  ABDOMEN: Soft, NT, ND.  +BS.    : No CVA tenderness  EXTREMITIES: Without  edema.  MSK: No joint swelling  NEUROLOGIC: No Focal Deficits   PSYCHIATRIC: Appropriate affect .  SKIN: No rash      Vitals:  T(F): 97.7 (26 Sep 2022 04:47), Max: 98.9 (25 Sep 2022 20:18)  HR: 68 (26 Sep 2022 04:47)  BP: 128/85 (26 Sep 2022 04:47)  RR: 18 (26 Sep 2022 04:47)  SpO2: 95% (26 Sep 2022 04:47) (94% - 96%)  temp max in last 48H T(F): , Max: 98.9 (09-25-22 @ 20:18)      Current Antibiotics:  meropenem Injectable 1000 milliGRAM(s) IV Push every 8 hours    Other medications:  amLODIPine   Tablet 2.5 milliGRAM(s) Oral daily  aspirin  chewable 81 milliGRAM(s) Oral daily  cholecalciferol 2000 Unit(s) Oral daily  indomethacin Suppository 100 milliGRAM(s) Rectal once  metoprolol succinate ER 50 milliGRAM(s) Oral daily  pantoprazole    Tablet 40 milliGRAM(s) Oral before breakfast                 14.5   10.20 )-----------( 229      ( 26 Sep 2022 05:00 )             41.2     09-26    139  |  103  |  14.7  ----------------------------<  85  3.5   |  24.0  |  0.79    Ca    9.4      26 Sep 2022 05:00    TPro  6.4<L>  /  Alb  3.7  /  TBili  0.6  /  DBili  x   /  AST  42<H>  /  ALT  177<H>  /  AlkPhos  198<H>  09-26    RECENT CULTURES:  09-24 @ 23:10 .Blood Blood-Peripheral     No growth to date.    09-24 @ 23:05 .Blood Blood-Peripheral Blood Culture PCR    Growth in anaerobic bottle: Gram Positive Cocci in Pairs and Chains  Growth in aerobic bottle: Gram positive cocci in pairs  ***Blood Panel PCR results on this specimen are available  approximately 3 hours after the Gram stain result.***  Gram stain, PCR, and/or culture results may not always  correspond due to difference in methodologies.  ************************************************************  This PCR assay was performed by multiplex PCR. This  Assay tests for 66 bacterial and resistance gene targets.  Please refer to the [facility] test directory  at [email or web address] for details.  Growth in anaerobic bottle: Gram Positive Cocci in Pairs and Chains  Growth in aerobic bottle: Gram positive cocci in pairs      WBC Count: 10.20 K/uL (09-26-22 @ 05:00)  WBC Count: 6.29 K/uL (09-25-22 @ 05:35)  WBC Count: 11.31 K/uL (09-24-22 @ 15:41)    Creatinine, Serum: 0.79 mg/dL (09-26-22 @ 05:00)  Creatinine, Serum: 0.72 mg/dL (09-25-22 @ 05:35)  Creatinine, Serum: 0.88 mg/dL (09-24-22 @ 15:41)     SARS-CoV-2 Result: NotDetec (09-24-22 @ 15:41)        < from: CT Abdomen and Pelvis w/ IV Cont (09.25.22 @ 01:20) >  ACC: [id number] EXAM:  CT ABDOMEN AND PELVIS IC                          PROCEDURE DATE:  09/25/2022      INTERPRETATION:  CLINICAL INFORMATION: 62 years old. Female. ruq pain,   r/o choledocho.    COMPARISON: None.    CONTRAST/COMPLICATIONS:  IVContrast: Omnipaque 350  93 cc administered  7 cc discarded.  Oral Contrast: NONE  Complications: None reported at time of study completion    PROCEDURE:  CT of the Abdomen and Pelvis was performed.  Sagittal and coronal reformats were performed.    FINDINGS:    LOWER CHEST: Within normal limits.    LIVER: Multiple subcentimeter hypodensities, too small to characterize.  BILE DUCTS: Choledocholithiasis, with a 2 mm calculus in the common bile   duct, which is dilated up to 1.2 cm and abruptly narrows.  GALLBLADDER: Tiny calcified gallstones. Mild gallbladder wall   thickening/edema.  SPLEEN: Within normal limits.  PANCREAS: Within normal limits.  ADRENALS: Within normal limits.  KIDNEYS/URETERS: Enhance symmetrically. No hydronephrosis. Left renal   cyst and subcentimeter hypodensities in the right kidney, too small to   characterize.    BLADDER: Within normal limits.  REPRODUCTIVE ORGANS: Calcified fibroid.    BOWEL: No bowel obstruction. Appendix is not definitively visualized.  PERITONEUM: No ascites.  VESSELS: Normal caliber abdominal aorta. Atherosclerosis.  RETROPERITONEUM/LYMPH NODES: No lymphadenopathy.  ABDOMINAL WALL: Within normal limits.  BONES: Degenerative changes in the spine.    IMPRESSION:    Choledocholithiasis, witha 2 mm calculus in the common bile duct, which   is dilated up to 1.2 cm and abruptly narrows.    Cholelithiasis with mild gallbladder wall thickening/edema.    --- End of Report ---    < end of copied text >

## 2022-09-26 NOTE — CONSULT NOTE ADULT - ASSESSMENT
Symptomatic cholelithiasis with choledocholithiasis. Will proceed with ERCP tomorrow. The risks vs. benefits of ERCP explained to the pt. who appeared to understand and was willing to proceed with ERCP and sphincterotomy. NPO after MN tonight. ERCP with report to follow tomorrow. Surgical evaluation for cholecystectomy thereafter.
62y  Female with h/o HTN, GERD. Patient came to the ED for abdominal pain in the RUQ  radiating to the back. Started on 9/25 after eating associated with nausea, chills and sweats. Had prior episodes and had been having  intermittent RUQ abdominal pain for the week prior. She noted first episode in 2020; saw her PCP and was told she had gall stone but not need to take out the gall bladder. In 2021 during thanksgiving she had another attack, did not seek any medical attention. In the ER patient was afebrile with leukocytosis to 11k. CT A/P reporting Choledocholithiasis. Started on Meropenem. Blood cultures with Streptococcus. s/p ERCP 9/26.      Streptococcus bacteremia  Choledocholithiasis s/p ERCP  Leukocytosis   PCN allergy       - Blood cultures 9/24 reporting Streptococcus   - Repeat blood cultures ordered   - CT A/P reporting Choledocholithiasis   - D/C Meropenem   - PCN allergy was when she was a child, and had hives   - Start Vancomycin  - Monitor trough  - Monitor for Vancomycin toxicity   - Vancomycin required at this time pending culture results  - Follow up cultures  - Trend Fever  - Trend WBC      Will Follow      d/w Clinical pharmacy

## 2022-09-27 ENCOUNTER — TRANSCRIPTION ENCOUNTER (OUTPATIENT)
Age: 62
End: 2022-09-27

## 2022-09-27 LAB
ALBUMIN SERPL ELPH-MCNC: 3.5 G/DL — SIGNIFICANT CHANGE UP (ref 3.3–5.2)
ALP SERPL-CCNC: 185 U/L — HIGH (ref 40–120)
ALT FLD-CCNC: 136 U/L — HIGH
ANION GAP SERPL CALC-SCNC: 12 MMOL/L — SIGNIFICANT CHANGE UP (ref 5–17)
AST SERPL-CCNC: 32 U/L — HIGH
BILIRUB DIRECT SERPL-MCNC: 0.2 MG/DL — SIGNIFICANT CHANGE UP (ref 0–0.3)
BILIRUB INDIRECT FLD-MCNC: 0.5 MG/DL — SIGNIFICANT CHANGE UP (ref 0.2–1)
BILIRUB SERPL-MCNC: 0.7 MG/DL — SIGNIFICANT CHANGE UP (ref 0.4–2)
BUN SERPL-MCNC: 15.2 MG/DL — SIGNIFICANT CHANGE UP (ref 8–20)
CALCIUM SERPL-MCNC: 9.5 MG/DL — SIGNIFICANT CHANGE UP (ref 8.4–10.5)
CHLORIDE SERPL-SCNC: 102 MMOL/L — SIGNIFICANT CHANGE UP (ref 98–107)
CO2 SERPL-SCNC: 24 MMOL/L — SIGNIFICANT CHANGE UP (ref 22–29)
CREAT SERPL-MCNC: 0.63 MG/DL — SIGNIFICANT CHANGE UP (ref 0.5–1.3)
EGFR: 100 ML/MIN/1.73M2 — SIGNIFICANT CHANGE UP
GLUCOSE SERPL-MCNC: 104 MG/DL — HIGH (ref 70–99)
HCT VFR BLD CALC: 41.1 % — SIGNIFICANT CHANGE UP (ref 34.5–45)
HGB BLD-MCNC: 14.2 G/DL — SIGNIFICANT CHANGE UP (ref 11.5–15.5)
MCHC RBC-ENTMCNC: 31.5 PG — SIGNIFICANT CHANGE UP (ref 27–34)
MCHC RBC-ENTMCNC: 34.5 GM/DL — SIGNIFICANT CHANGE UP (ref 32–36)
MCV RBC AUTO: 91.1 FL — SIGNIFICANT CHANGE UP (ref 80–100)
PLATELET # BLD AUTO: 220 K/UL — SIGNIFICANT CHANGE UP (ref 150–400)
POTASSIUM SERPL-MCNC: 3.7 MMOL/L — SIGNIFICANT CHANGE UP (ref 3.5–5.3)
POTASSIUM SERPL-SCNC: 3.7 MMOL/L — SIGNIFICANT CHANGE UP (ref 3.5–5.3)
PROCALCITONIN SERPL-MCNC: 0.31 NG/ML — HIGH (ref 0.02–0.1)
PROT SERPL-MCNC: 6.4 G/DL — LOW (ref 6.6–8.7)
RBC # BLD: 4.51 M/UL — SIGNIFICANT CHANGE UP (ref 3.8–5.2)
RBC # FLD: 12.4 % — SIGNIFICANT CHANGE UP (ref 10.3–14.5)
SODIUM SERPL-SCNC: 138 MMOL/L — SIGNIFICANT CHANGE UP (ref 135–145)
WBC # BLD: 6.7 K/UL — SIGNIFICANT CHANGE UP (ref 3.8–10.5)
WBC # FLD AUTO: 6.7 K/UL — SIGNIFICANT CHANGE UP (ref 3.8–10.5)

## 2022-09-27 PROCEDURE — 99233 SBSQ HOSP IP/OBS HIGH 50: CPT

## 2022-09-27 PROCEDURE — 88304 TISSUE EXAM BY PATHOLOGIST: CPT | Mod: 26

## 2022-09-27 DEVICE — CLIP APPLIER COVIDIEN ENDOCLIP III 5MM: Type: IMPLANTABLE DEVICE | Status: FUNCTIONAL

## 2022-09-27 RX ORDER — FENTANYL CITRATE 50 UG/ML
25 INJECTION INTRAVENOUS
Refills: 0 | Status: DISCONTINUED | OUTPATIENT
Start: 2022-09-27 | End: 2022-09-27

## 2022-09-27 RX ORDER — AMLODIPINE BESYLATE 2.5 MG/1
2.5 TABLET ORAL DAILY
Refills: 0 | Status: DISCONTINUED | OUTPATIENT
Start: 2022-09-27 | End: 2022-09-28

## 2022-09-27 RX ORDER — MEROPENEM 1 G/30ML
1000 INJECTION INTRAVENOUS EVERY 8 HOURS
Refills: 0 | Status: DISCONTINUED | OUTPATIENT
Start: 2022-09-27 | End: 2022-09-28

## 2022-09-27 RX ORDER — OXYCODONE AND ACETAMINOPHEN 5; 325 MG/1; MG/1
2 TABLET ORAL EVERY 4 HOURS
Refills: 0 | Status: DISCONTINUED | OUTPATIENT
Start: 2022-09-27 | End: 2022-09-28

## 2022-09-27 RX ORDER — VANCOMYCIN HCL 1 G
1000 VIAL (EA) INTRAVENOUS EVERY 12 HOURS
Refills: 0 | Status: DISCONTINUED | OUTPATIENT
Start: 2022-09-27 | End: 2022-09-28

## 2022-09-27 RX ORDER — ACETAMINOPHEN 500 MG
650 TABLET ORAL EVERY 6 HOURS
Refills: 0 | Status: DISCONTINUED | OUTPATIENT
Start: 2022-09-27 | End: 2022-09-28

## 2022-09-27 RX ORDER — ONDANSETRON 8 MG/1
4 TABLET, FILM COATED ORAL EVERY 8 HOURS
Refills: 0 | Status: DISCONTINUED | OUTPATIENT
Start: 2022-09-27 | End: 2022-09-28

## 2022-09-27 RX ORDER — PANTOPRAZOLE SODIUM 20 MG/1
40 TABLET, DELAYED RELEASE ORAL
Refills: 0 | Status: DISCONTINUED | OUTPATIENT
Start: 2022-09-27 | End: 2022-09-28

## 2022-09-27 RX ORDER — LANOLIN ALCOHOL/MO/W.PET/CERES
3 CREAM (GRAM) TOPICAL AT BEDTIME
Refills: 0 | Status: DISCONTINUED | OUTPATIENT
Start: 2022-09-27 | End: 2022-09-28

## 2022-09-27 RX ORDER — OXYCODONE AND ACETAMINOPHEN 5; 325 MG/1; MG/1
1 TABLET ORAL EVERY 4 HOURS
Refills: 0 | Status: DISCONTINUED | OUTPATIENT
Start: 2022-09-27 | End: 2022-09-28

## 2022-09-27 RX ORDER — ONDANSETRON 8 MG/1
4 TABLET, FILM COATED ORAL ONCE
Refills: 0 | Status: DISCONTINUED | OUTPATIENT
Start: 2022-09-27 | End: 2022-09-27

## 2022-09-27 RX ORDER — HYDROMORPHONE HYDROCHLORIDE 2 MG/ML
0.5 INJECTION INTRAMUSCULAR; INTRAVENOUS; SUBCUTANEOUS
Refills: 0 | Status: DISCONTINUED | OUTPATIENT
Start: 2022-09-27 | End: 2022-09-27

## 2022-09-27 RX ORDER — METOPROLOL TARTRATE 50 MG
50 TABLET ORAL DAILY
Refills: 0 | Status: DISCONTINUED | OUTPATIENT
Start: 2022-09-27 | End: 2022-09-28

## 2022-09-27 RX ADMIN — Medication 650 MILLIGRAM(S): at 02:30

## 2022-09-27 RX ADMIN — Medication 250 MILLIGRAM(S): at 04:16

## 2022-09-27 RX ADMIN — Medication 250 MILLIGRAM(S): at 22:43

## 2022-09-27 RX ADMIN — MEROPENEM 1000 MILLIGRAM(S): 1 INJECTION INTRAVENOUS at 22:38

## 2022-09-27 RX ADMIN — Medication 50 MILLIGRAM(S): at 06:34

## 2022-09-27 RX ADMIN — OXYCODONE AND ACETAMINOPHEN 2 TABLET(S): 5; 325 TABLET ORAL at 21:33

## 2022-09-27 RX ADMIN — AMLODIPINE BESYLATE 2.5 MILLIGRAM(S): 2.5 TABLET ORAL at 06:34

## 2022-09-27 RX ADMIN — HYDROMORPHONE HYDROCHLORIDE 0.5 MILLIGRAM(S): 2 INJECTION INTRAMUSCULAR; INTRAVENOUS; SUBCUTANEOUS at 15:32

## 2022-09-27 RX ADMIN — MEROPENEM 1000 MILLIGRAM(S): 1 INJECTION INTRAVENOUS at 06:33

## 2022-09-27 RX ADMIN — Medication 650 MILLIGRAM(S): at 01:31

## 2022-09-27 RX ADMIN — PANTOPRAZOLE SODIUM 40 MILLIGRAM(S): 20 TABLET, DELAYED RELEASE ORAL at 06:34

## 2022-09-27 NOTE — CHART NOTE - NSCHARTNOTEFT_GEN_A_CORE
S/P laparoscopic cholecystectomy  No intraoperative complications  Tolerated the procedure well  Patient can be started on regular diet  Can shower only no submerging in bathtub or pool for 4 weeks  Do not remove the steri-strips  She is to avoid carrying anything heavy more than 10lbs for 4 weeks    Patient can follow up with Dr Singh in clinic in 2 weeks for post op check    Rest of the management per primary team
ERCP with sphincterotomy and stone removal   See full procedure report

## 2022-09-27 NOTE — BRIEF OPERATIVE NOTE - NSICDXBRIEFPOSTOP_GEN_ALL_CORE_FT
POST-OP DIAGNOSIS:  Acute cholecystitis due to biliary calculus 27-Sep-2022 15:09:24  Natanael Schroeder

## 2022-09-27 NOTE — ASU PREOP CHECKLIST - WEIGHT IN LBS
138 Minocycline Counseling: Patient advised regarding possible photosensitivity and discoloration of the teeth, skin, lips, tongue and gums.  Patient instructed to avoid sunlight, if possible.  When exposed to sunlight, patients should wear protective clothing, sunglasses, and sunscreen.  The patient was instructed to call the office immediately if the following severe adverse effects occur:  hearing changes, easy bruising/bleeding, severe headache, or vision changes.  The patient verbalized understanding of the proper use and possible adverse effects of minocycline.  All of the patient's questions and concerns were addressed.

## 2022-09-28 ENCOUNTER — TRANSCRIPTION ENCOUNTER (OUTPATIENT)
Age: 62
End: 2022-09-28

## 2022-09-28 VITALS
OXYGEN SATURATION: 93 % | HEART RATE: 76 BPM | RESPIRATION RATE: 18 BRPM | DIASTOLIC BLOOD PRESSURE: 85 MMHG | TEMPERATURE: 99 F | SYSTOLIC BLOOD PRESSURE: 147 MMHG

## 2022-09-28 LAB
ANION GAP SERPL CALC-SCNC: 11 MMOL/L — SIGNIFICANT CHANGE UP (ref 5–17)
BASOPHILS # BLD AUTO: 0.04 K/UL — SIGNIFICANT CHANGE UP (ref 0–0.2)
BASOPHILS NFR BLD AUTO: 0.5 % — SIGNIFICANT CHANGE UP (ref 0–2)
BUN SERPL-MCNC: 13.1 MG/DL — SIGNIFICANT CHANGE UP (ref 8–20)
CALCIUM SERPL-MCNC: 9.4 MG/DL — SIGNIFICANT CHANGE UP (ref 8.4–10.5)
CHLORIDE SERPL-SCNC: 101 MMOL/L — SIGNIFICANT CHANGE UP (ref 98–107)
CO2 SERPL-SCNC: 24 MMOL/L — SIGNIFICANT CHANGE UP (ref 22–29)
CREAT SERPL-MCNC: 0.68 MG/DL — SIGNIFICANT CHANGE UP (ref 0.5–1.3)
EGFR: 98 ML/MIN/1.73M2 — SIGNIFICANT CHANGE UP
EOSINOPHIL # BLD AUTO: 0.23 K/UL — SIGNIFICANT CHANGE UP (ref 0–0.5)
EOSINOPHIL NFR BLD AUTO: 2.7 % — SIGNIFICANT CHANGE UP (ref 0–6)
GLUCOSE SERPL-MCNC: 81 MG/DL — SIGNIFICANT CHANGE UP (ref 70–99)
HCT VFR BLD CALC: 40 % — SIGNIFICANT CHANGE UP (ref 34.5–45)
HGB BLD-MCNC: 13.4 G/DL — SIGNIFICANT CHANGE UP (ref 11.5–15.5)
IMM GRANULOCYTES NFR BLD AUTO: 0.2 % — SIGNIFICANT CHANGE UP (ref 0–0.9)
LYMPHOCYTES # BLD AUTO: 1.19 K/UL — SIGNIFICANT CHANGE UP (ref 1–3.3)
LYMPHOCYTES # BLD AUTO: 13.7 % — SIGNIFICANT CHANGE UP (ref 13–44)
MCHC RBC-ENTMCNC: 30.7 PG — SIGNIFICANT CHANGE UP (ref 27–34)
MCHC RBC-ENTMCNC: 33.5 GM/DL — SIGNIFICANT CHANGE UP (ref 32–36)
MCV RBC AUTO: 91.7 FL — SIGNIFICANT CHANGE UP (ref 80–100)
MONOCYTES # BLD AUTO: 0.93 K/UL — HIGH (ref 0–0.9)
MONOCYTES NFR BLD AUTO: 10.7 % — SIGNIFICANT CHANGE UP (ref 2–14)
NEUTROPHILS # BLD AUTO: 6.25 K/UL — SIGNIFICANT CHANGE UP (ref 1.8–7.4)
NEUTROPHILS NFR BLD AUTO: 72.2 % — SIGNIFICANT CHANGE UP (ref 43–77)
PLATELET # BLD AUTO: 230 K/UL — SIGNIFICANT CHANGE UP (ref 150–400)
POTASSIUM SERPL-MCNC: 3.7 MMOL/L — SIGNIFICANT CHANGE UP (ref 3.5–5.3)
POTASSIUM SERPL-SCNC: 3.7 MMOL/L — SIGNIFICANT CHANGE UP (ref 3.5–5.3)
RBC # BLD: 4.36 M/UL — SIGNIFICANT CHANGE UP (ref 3.8–5.2)
RBC # FLD: 12.4 % — SIGNIFICANT CHANGE UP (ref 10.3–14.5)
SODIUM SERPL-SCNC: 136 MMOL/L — SIGNIFICANT CHANGE UP (ref 135–145)
VANCOMYCIN TROUGH SERPL-MCNC: 13.5 UG/ML — SIGNIFICANT CHANGE UP (ref 10–20)
WBC # BLD: 8.66 K/UL — SIGNIFICANT CHANGE UP (ref 3.8–10.5)
WBC # FLD AUTO: 8.66 K/UL — SIGNIFICANT CHANGE UP (ref 3.8–10.5)

## 2022-09-28 PROCEDURE — 96375 TX/PRO/DX INJ NEW DRUG ADDON: CPT

## 2022-09-28 PROCEDURE — 99285 EMERGENCY DEPT VISIT HI MDM: CPT | Mod: 25

## 2022-09-28 PROCEDURE — 88304 TISSUE EXAM BY PATHOLOGIST: CPT

## 2022-09-28 PROCEDURE — 74177 CT ABD & PELVIS W/CONTRAST: CPT | Mod: MA

## 2022-09-28 PROCEDURE — 84145 PROCALCITONIN (PCT): CPT

## 2022-09-28 PROCEDURE — 93005 ELECTROCARDIOGRAM TRACING: CPT

## 2022-09-28 PROCEDURE — 99232 SBSQ HOSP IP/OBS MODERATE 35: CPT

## 2022-09-28 PROCEDURE — C1889: CPT

## 2022-09-28 PROCEDURE — 85610 PROTHROMBIN TIME: CPT

## 2022-09-28 PROCEDURE — 86803 HEPATITIS C AB TEST: CPT

## 2022-09-28 PROCEDURE — 36415 COLL VENOUS BLD VENIPUNCTURE: CPT

## 2022-09-28 PROCEDURE — U0003: CPT

## 2022-09-28 PROCEDURE — 80202 ASSAY OF VANCOMYCIN: CPT

## 2022-09-28 PROCEDURE — 86900 BLOOD TYPING SEROLOGIC ABO: CPT

## 2022-09-28 PROCEDURE — 81001 URINALYSIS AUTO W/SCOPE: CPT

## 2022-09-28 PROCEDURE — 87040 BLOOD CULTURE FOR BACTERIA: CPT

## 2022-09-28 PROCEDURE — 80048 BASIC METABOLIC PNL TOTAL CA: CPT

## 2022-09-28 PROCEDURE — 85027 COMPLETE CBC AUTOMATED: CPT

## 2022-09-28 PROCEDURE — 99239 HOSP IP/OBS DSCHRG MGMT >30: CPT

## 2022-09-28 PROCEDURE — C1773: CPT

## 2022-09-28 PROCEDURE — 86850 RBC ANTIBODY SCREEN: CPT

## 2022-09-28 PROCEDURE — C1769: CPT

## 2022-09-28 PROCEDURE — 80053 COMPREHEN METABOLIC PANEL: CPT

## 2022-09-28 PROCEDURE — 87077 CULTURE AEROBIC IDENTIFY: CPT

## 2022-09-28 PROCEDURE — 86901 BLOOD TYPING SEROLOGIC RH(D): CPT

## 2022-09-28 PROCEDURE — 74329 X-RAY FOR PANCREAS ENDOSCOPY: CPT

## 2022-09-28 PROCEDURE — C9399: CPT

## 2022-09-28 PROCEDURE — 87637 SARSCOV2&INF A&B&RSV AMP PRB: CPT

## 2022-09-28 PROCEDURE — 83690 ASSAY OF LIPASE: CPT

## 2022-09-28 PROCEDURE — 85025 COMPLETE CBC W/AUTO DIFF WBC: CPT

## 2022-09-28 PROCEDURE — 76705 ECHO EXAM OF ABDOMEN: CPT

## 2022-09-28 PROCEDURE — 80076 HEPATIC FUNCTION PANEL: CPT

## 2022-09-28 PROCEDURE — 87150 DNA/RNA AMPLIFIED PROBE: CPT

## 2022-09-28 PROCEDURE — 83605 ASSAY OF LACTIC ACID: CPT

## 2022-09-28 PROCEDURE — U0005: CPT

## 2022-09-28 PROCEDURE — 96374 THER/PROPH/DIAG INJ IV PUSH: CPT

## 2022-09-28 PROCEDURE — 86703 HIV-1/HIV-2 1 RESULT ANTBDY: CPT

## 2022-09-28 RX ORDER — SIMETHICONE 80 MG/1
1 TABLET, CHEWABLE ORAL
Qty: 20 | Refills: 0
Start: 2022-09-28 | End: 2022-10-02

## 2022-09-28 RX ADMIN — AMLODIPINE BESYLATE 2.5 MILLIGRAM(S): 2.5 TABLET ORAL at 06:39

## 2022-09-28 RX ADMIN — MEROPENEM 1000 MILLIGRAM(S): 1 INJECTION INTRAVENOUS at 16:07

## 2022-09-28 RX ADMIN — MEROPENEM 1000 MILLIGRAM(S): 1 INJECTION INTRAVENOUS at 06:39

## 2022-09-28 RX ADMIN — Medication 250 MILLIGRAM(S): at 06:40

## 2022-09-28 RX ADMIN — Medication 50 MILLIGRAM(S): at 06:39

## 2022-09-28 RX ADMIN — OXYCODONE AND ACETAMINOPHEN 2 TABLET(S): 5; 325 TABLET ORAL at 06:47

## 2022-09-28 RX ADMIN — PANTOPRAZOLE SODIUM 40 MILLIGRAM(S): 20 TABLET, DELAYED RELEASE ORAL at 06:39

## 2022-09-28 NOTE — PROGRESS NOTE ADULT - REASON FOR ADMISSION
Choledocholithiasis

## 2022-09-28 NOTE — DISCHARGE NOTE NURSING/CASE MANAGEMENT/SOCIAL WORK - PATIENT PORTAL LINK FT
You can access the FollowMyHealth Patient Portal offered by [facility] by registering at the following website: [email or web address]. By joining [employer]’s FollowMyHealth portal, you will also be able to view your health information using other applications (apps) compatible with our system.

## 2022-09-28 NOTE — DISCHARGE NOTE NURSING/CASE MANAGEMENT/SOCIAL WORK - NSPROMEDSBROUGHTTOHOSP_GEN_A_NUR
Lumbago with right LE radiculopathy  Admit to Dr Tejada  Rapid COVID needed for admission/preop  Pain management: Medications ordered, decadron ordered  NPO except for medications after midnight  Medical clearance pending  EKG: NSR  F/U CXR  Pt seen in ER with Dr Armani Tejada.   Planning for laminectomy L4-5 tomorrow  Pt amenable to plan. no

## 2022-09-28 NOTE — PROGRESS NOTE ADULT - SUBJECTIVE AND OBJECTIVE BOX
Anesthesia Chart review    62y Female    penicillin (Hives)  sulfa drugs (Hives)      Calculus of bile duct without obstruction, cholangitis, or cholecystitis    FH: HTN (hypertension) (Mother)    Handoff    MEWS Score    No pertinent past medical history    Medina&#x27;s esophagus    GERD (gastroesophageal reflux disease)    Former smoker    Hypertension    Mitral regurgitation    Tricuspid regurgitation    Choledocholithiasis    No significant past surgical history    S/P MVR (mitral valve repair)    UPPER ABD/BACK PAIN    90+    Symptomatic cholelithiasis    SysAdmin_VisitLink        HPI:  61 y/o female with PMH of HTN, GERD came to the ED because "my gall bladder decided to act up again for the third time". Patient reported RUQ abdominal pain x 1 week radiating to the back, associated with nausea; saying it has been on and off but yesterday it was constant and progressively worsened. She noted first episode in ; saw her PCP who also is a GI at the time, was told she had gall stone but not need to take out the gall bladder. Last year during  she had another attack, did not seek any medical advised. She has no vomiting, fever, chills, chest pain, shortness of  breath, recent travel, sick contact, diarrhea, dysuria.   (25 Sep 2022 03:59)      PAST MEDICAL & SURGICAL HISTORY:  Medina&#x27;s esophagus      GERD (gastroesophageal reflux disease)      Former smoker      Hypertension      Mitral regurgitation  suggestive of a torn chordae      Tricuspid regurgitation      S/P MVR (mitral valve repair)          MEDICATIONS  (STANDING):  amLODIPine   Tablet 2.5 milliGRAM(s) Oral daily  aspirin  chewable 81 milliGRAM(s) Oral daily  cholecalciferol 2000 Unit(s) Oral daily  indomethacin Suppository 100 milliGRAM(s) Rectal once  meropenem Injectable 1000 milliGRAM(s) IV Push every 8 hours  metoprolol succinate ER 50 milliGRAM(s) Oral daily  pantoprazole    Tablet 40 milliGRAM(s) Oral before breakfast  vancomycin  IVPB 1000 milliGRAM(s) IV Intermittent every 12 hours    MEDICATIONS  (PRN):  acetaminophen     Tablet .. 650 milliGRAM(s) Oral every 6 hours PRN Temp greater or equal to 38C (100.4F), Mild Pain (1 - 3)  aluminum hydroxide/magnesium hydroxide/simethicone Suspension 30 milliLiter(s) Oral every 4 hours PRN Dyspepsia  HYDROmorphone  Injectable 0.5 milliGRAM(s) IV Push every 4 hours PRN Moderate Pain (4 - 6)  melatonin 3 milliGRAM(s) Oral at bedtime PRN Insomnia  ondansetron Injectable 4 milliGRAM(s) IV Push every 8 hours PRN Nausea and/or Vomiting      Allergies    penicillin (Hives)  sulfa drugs (Hives)    Intolerances        SOCIAL HISTORY:    FAMILY HISTORY:  FH: HTN (hypertension) (Mother)        Vital Signs Last 24 Hrs  T(C): 36.5 (26 Sep 2022 04:47), Max: 37.2 (25 Sep 2022 20:18)  T(F): 97.7 (26 Sep 2022 04:47), Max: 98.9 (25 Sep 2022 20:18)  HR: 68 (26 Sep 2022 04:47) (62 - 68)  BP: 128/85 (26 Sep 2022 04:47) (128/85 - 151/71)  BP(mean): --  RR: 18 (26 Sep 2022 04:47) (16 - 18)  SpO2: 95% (26 Sep 2022 04:47) (94% - 96%)    Parameters below as of 26 Sep 2022 04:47  Patient On (Oxygen Delivery Method): room air              LABS:                        14.5   10.20 )-----------( 229      ( 26 Sep 2022 05:00 )             41.2         139  |  103  |  14.7  ----------------------------<  85  3.5   |  24.0  |  0.79    Ca    9.4      26 Sep 2022 05:00    TPro  6.4<L>  /  Alb  3.7  /  TBili  0.6  /  DBili  x   /  AST  42<H>  /  ALT  177<H>  /  AlkPhos  198<H>        Urinalysis Basic - ( 24 Sep 2022 18:30 )    Color: Yellow / Appearance: Clear / S.020 / pH: x  Gluc: x / Ketone: Large  / Bili: Negative / Urobili: 8 mg/dL   Blood: x / Protein: Negative / Nitrite: Negative   Leuk Esterase: Negative / RBC: 3-5 /HPF / WBC Negative /HPF   Sq Epi: x / Non Sq Epi: Occasional / Bacteria: Occasional        RADIOLOGY & ADDITIONAL STUDIES:    Patient is an ASA class   3    plan : GA
BOGDAN HENDRICKS  ----------------------------------------  The patient was seen at bedside. Patient with choledocholithiasis. Noted some abdominal discomfort. Tolerated oral intake.    Vital Signs Last 24 Hrs  T(C): 36.6 (28 Sep 2022 10:42), Max: 37.2 (27 Sep 2022 23:59)  T(F): 97.9 (28 Sep 2022 10:42), Max: 98.9 (27 Sep 2022 23:59)  HR: 70 (28 Sep 2022 10:42) (61 - 78)  BP: 151/71 (28 Sep 2022 10:42) (116/72 - 157/87)  BP(mean): --  RR: 18 (28 Sep 2022 10:42) (15 - 18)  SpO2: 95% (28 Sep 2022 10:42) (93% - 99%)    Parameters below as of 28 Sep 2022 10:42  Patient On (Oxygen Delivery Method): room air    PHYSICAL EXAMINATION:  ----------------------------------------  General appearance: No acute distress, Awake, Alert  HEENT: Normocephalic, Atraumatic, Conjunctiva clear, EOMI  Neck: Supple, No JVD, No tenderness  Lungs: Breath sound equal bilaterally, No wheezes, No rales  Cardiovascular: S1S2, Regular rhythm  Abdomen: Soft, Nontender, Nondistended, No guarding/rebound, Positive bowel sounds, Incisions clean and intact  Extremities: No clubbing, No cyanosis, No edema, No calf tenderness  Neuro: Strength equal bilaterally, No tremors  Psychiatric: Appropriate mood, Normal affect    LABORATORY STUDIES:  ----------------------------------------             13.4   8.66  )-----------( 230      ( 28 Sep 2022 04:52 )             40.0     09-28    136  |  101  |  13.1  ----------------------------<  81  3.7   |  24.0  |  0.68    Ca    9.4      28 Sep 2022 04:52    TPro  6.4<L>  /  Alb  3.5  /  TBili  0.7  /  DBili  0.2  /  AST  32<H>  /  ALT  136<H>  /  AlkPhos  185<H>  09-27    LIVER FUNCTIONS - ( 27 Sep 2022 04:45 )  Alb: 3.5 g/dL / Pro: 6.4 g/dL / ALK PHOS: 185 U/L / ALT: 136 U/L / AST: 32 U/L / GGT: x           Culture - Blood (collected 26 Sep 2022 14:50)  Source: .Blood Blood  Preliminary Report (27 Sep 2022 20:01):    No growth to date.    Culture - Blood (collected 26 Sep 2022 14:46)  Source: .Blood Blood  Preliminary Report (27 Sep 2022 20:01):    No growth to date.    MEDICATIONS  (STANDING):  amLODIPine   Tablet 2.5 milliGRAM(s) Oral daily  meropenem Injectable 1000 milliGRAM(s) IV Push every 8 hours  metoprolol succinate ER 50 milliGRAM(s) Oral daily  pantoprazole    Tablet 40 milliGRAM(s) Oral before breakfast  vancomycin  IVPB 1000 milliGRAM(s) IV Intermittent every 12 hours    MEDICATIONS  (PRN):  acetaminophen     Tablet .. 650 milliGRAM(s) Oral every 6 hours PRN Temp greater or equal to 38C (100.4F), Mild Pain (1 - 3)  melatonin 3 milliGRAM(s) Oral at bedtime PRN Insomnia  ondansetron Injectable 4 milliGRAM(s) IV Push every 8 hours PRN Nausea and/or Vomiting  oxycodone    5 mG/acetaminophen 325 mG 1 Tablet(s) Oral every 4 hours PRN Moderate Pain (4 - 6)  oxycodone    5 mG/acetaminophen 325 mG 2 Tablet(s) Oral every 4 hours PRN Severe Pain (7 - 10)      ASSESSMENT / PLAN:  ----------------------------------------  62F with a history of hypertension, GERD, and gallstones who presented with abdominal pain. Laboratory studies were notable for elevated AlkPhos and AST/ALT and abdominal ultrasound noted dilated commons bile duct with distal common bile duct calculi. The patient underwent ERCP with stone removal followed by cholecystectomy.    Choledocholithiasis / Cholelithiasis / Transaminitis - Status post ERCP with sphincterotomy and stone removal. Cholecystectomy completed. Tolerated oral intake. Meropenem and vancomycin. Pending final recommendations for antibiotics from Infectious Disease.    Bacteremia - Blood culture grew Strep mitis. Afebrile and without leukocytosis. Repeat blood cultures were without growth.    Hypertension - Close blood pressure monitoring. On amlodipine and metoprolol.    GERD - On pantoprazole.  
BOGDAN HENDRICKS  ----------------------------------------  The patient was seen at bedside. Patient with choledocholithiasis. Reports resolution of abdominal pain.    Vital Signs Last 24 Hrs  T(C): 36.6 (25 Sep 2022 10:44), Max: 36.9 (24 Sep 2022 18:47)  T(F): 97.9 (25 Sep 2022 10:44), Max: 98.4 (24 Sep 2022 18:47)  HR: 65 (25 Sep 2022 10:44) (57 - 88)  BP: 131/73 (25 Sep 2022 10:44) (121/61 - 169/77)  BP(mean): --  RR: 14 (25 Sep 2022 10:44) (14 - 24)  SpO2: 98% (25 Sep 2022 10:44) (97% - 98%)    Parameters below as of 25 Sep 2022 10:44  Patient On (Oxygen Delivery Method): room air    PHYSICAL EXAMINATION:  ----------------------------------------  General appearance: No acute distress, Awake, Alert  HEENT: Normocephalic, Atraumatic, Conjunctiva clear, EOMI  Neck: Supple, No JVD, No tenderness  Lungs: Breath sound equal bilaterally, No wheezes, No rales  Cardiovascular: S1S2, Regular rhythm  Abdomen: Soft, Nontender, Nondistended, No guarding/rebound, Positive bowel sounds  Extremities: No clubbing, No cyanosis, No edema, No calf tenderness  Neuro: Strength equal bilaterally, No tremors  Psychiatric: Appropriate mood, Normal affect    LABORATORY STUDIES:  ----------------------------------------             13.6   6.29  )-----------( 194      ( 25 Sep 2022 05:35 )             40.4     09-25    140  |  104  |  14.3  ----------------------------<  59<L>  3.5   |  20.0<L>  |  0.72    Ca    9.2      25 Sep 2022 05:35    TPro  6.3<L>  /  Alb  3.7  /  TBili  0.7  /  DBili  x   /  AST  66<H>  /  ALT  236<H>  /  AlkPhos  216<H>  09-25    LIVER FUNCTIONS - ( 25 Sep 2022 05:35 )  Alb: 3.7 g/dL / Pro: 6.3 g/dL / ALK PHOS: 216 U/L / ALT: 236 U/L / AST: 66 U/L / GGT: x           PT/INR - ( 24 Sep 2022 15:52 )   PT: 12.3 sec;   INR: 1.06 ratio      Urinalysis Basic - ( 24 Sep 2022 18:30 )  Color: Yellow / Appearance: Clear / S.020 / pH: x  Gluc: x / Ketone: Large  / Bili: Negative / Urobili: 8 mg/dL   Blood: x / Protein: Negative / Nitrite: Negative   Leuk Esterase: Negative / RBC: 3-5 /HPF / WBC Negative /HPF   Sq Epi: x / Non Sq Epi: Occasional / Bacteria: Occasional    MEDICATIONS  (STANDING):  amLODIPine   Tablet 2.5 milliGRAM(s) Oral daily  aspirin  chewable 81 milliGRAM(s) Oral daily  cholecalciferol 2000 Unit(s) Oral daily  indomethacin Suppository 100 milliGRAM(s) Rectal once  meropenem Injectable 1000 milliGRAM(s) IV Push every 8 hours  metoprolol succinate ER 50 milliGRAM(s) Oral daily  pantoprazole    Tablet 40 milliGRAM(s) Oral before breakfast    MEDICATIONS  (PRN):  acetaminophen     Tablet .. 650 milliGRAM(s) Oral every 6 hours PRN Temp greater or equal to 38C (100.4F), Mild Pain (1 - 3)  aluminum hydroxide/magnesium hydroxide/simethicone Suspension 30 milliLiter(s) Oral every 4 hours PRN Dyspepsia  HYDROmorphone  Injectable 0.5 milliGRAM(s) IV Push every 2 hours PRN Pain  melatonin 3 milliGRAM(s) Oral at bedtime PRN Insomnia  ondansetron Injectable 4 milliGRAM(s) IV Push every 8 hours PRN Nausea and/or Vomiting      ASSESSMENT / PLAN:  ----------------------------------------  62F with a history of hypertension, GERD, and gallstones who presented with abdominal pain. Laboratory studies were notable for elevated AlkPhos and AST/ALT and abdominal ultrasound noted dilated commons bile duct with distal common bile duct calculi.    Choledocholithiasis / Cholelithiasis / Transaminitis - Surgery and Gastroenterology consultation noted. Planned for ERCP followed by cholecystectomy. On meropenem. Afebrile with resolved leukocytosis.    Hypertension - Close blood pressure monitoring. On amlodipine.    GERD - On pantoprazole.    Discussed with the patient and her  at the bedside.
INTERVAL HPI/OVERNIGHT EVENTS:    Patient seen and examined at bedside. NAOE. Denies N/V/CP/SOB, no subjective fevers or chills. Reports feeling well, improvement in abdominal discomfort. Plan for OR today    MEDICATIONS  (STANDING):  amLODIPine   Tablet 2.5 milliGRAM(s) Oral daily  aspirin  chewable 81 milliGRAM(s) Oral daily  cholecalciferol 2000 Unit(s) Oral daily  indomethacin Suppository 100 milliGRAM(s) Rectal once  meropenem Injectable 1000 milliGRAM(s) IV Push every 8 hours  metoprolol succinate ER 50 milliGRAM(s) Oral daily  pantoprazole    Tablet 40 milliGRAM(s) Oral before breakfast  vancomycin  IVPB 1000 milliGRAM(s) IV Intermittent every 12 hours    MEDICATIONS  (PRN):  acetaminophen     Tablet .. 650 milliGRAM(s) Oral every 6 hours PRN Temp greater or equal to 38C (100.4F), Mild Pain (1 - 3)  aluminum hydroxide/magnesium hydroxide/simethicone Suspension 30 milliLiter(s) Oral every 4 hours PRN Dyspepsia  HYDROmorphone  Injectable 0.5 milliGRAM(s) IV Push every 4 hours PRN Moderate Pain (4 - 6)  melatonin 3 milliGRAM(s) Oral at bedtime PRN Insomnia  ondansetron Injectable 4 milliGRAM(s) IV Push every 8 hours PRN Nausea and/or Vomiting      Vital Signs Last 24 Hrs  T(C): 36.7 (26 Sep 2022 21:47), Max: 36.7 (26 Sep 2022 21:47)  T(F): 98.1 (26 Sep 2022 21:47), Max: 98.1 (26 Sep 2022 21:47)  HR: 66 (26 Sep 2022 21:47) (63 - 68)  BP: 128/74 (26 Sep 2022 21:47) (121/75 - 128/85)  BP(mean): --  RR: 19 (26 Sep 2022 21:47) (18 - 19)  SpO2: 95% (26 Sep 2022 21:47) (95% - 96%)    Parameters below as of 26 Sep 2022 21:47  Patient On (Oxygen Delivery Method): room air      General: NAD. Lying comfortably in bed.  HEENT: Neck supple. EOMI.  Respiratory: respirations even/unlabored on room air  Cardio: RRR  Abdomen: Soft, non-tender, non-distended. No guarding or rebound.  Extremities: No clubbing or cyanosis.  Neuro: A&O x 3. No focal deficits.      I&O's Detail      LABS:                        14.5   10.20 )-----------( 229      ( 26 Sep 2022 05:00 )             41.2     09-26    139  |  103  |  14.7  ----------------------------<  85  3.5   |  24.0  |  0.79    Ca    9.4      26 Sep 2022 05:00    TPro  6.4<L>  /  Alb  3.7  /  TBili  0.6  /  DBili  x   /  AST  42<H>  /  ALT  177<H>  /  AlkPhos  198<H>  09-26          RADIOLOGY & ADDITIONAL STUDIES:    Assessment and Plan:  · Assessment        62F admitted for choledocholithaisis, possible cholecystitis. Stones visualized in US in [place]. Now s/p ERCP. Plan for cholecystectomy today.      PLAN:    Meropenem (9/25-)  Care per medicine  1. lap khai today
[facility]  INFECTIOUS DISEASES at [place] and [place]  =======================================================                               Kenny Arroyo MD#   Bryson Gomez MD*                             Elly Pitt MD*   Rebecca Carney MD*            Diplomates American Board of Internal Medicine & Infectious Diseases                # [place] Office - Appt - Tel  [phone] Fax [phone]                * [place] Office - Appt - Tel [phone] Fax [phone]                                  Hospital Consult line:  [phone]  =======================================================    BOGDAN HENDRICKS [id number]    Follow up: Positive blood culture    No fevers  s/p laparoscopic cholecystectomy 9/27/22  s/p ERCP 9/26      Allergies:  penicillin (Hives)  sulfa drugs (Hives)       REVIEW OF SYSTEMS:  CONSTITUTIONAL:  No Fever or chills  HEENT:   No diplopia or blurred vision.  No earache, sore throat or runny nose.  CARDIOVASCULAR:  No Chest Pain  RESPIRATORY:  No cough, shortness of breath  GASTROINTESTINAL:  No nausea, vomiting or diarrhea.  GENITOURINARY:  No dysuria, frequency or urgency. No Blood in urine  MUSCULOSKELETAL:  no joint aches, no muscle pain  SKIN:  No change in skin, hair or nails.  NEUROLOGIC:  No Headaches, seizures   PSYCHIATRIC:  No disorder of thought or mood.  ENDOCRINE:  No heat or cold intolerance  HEMATOLOGICAL:  No easy bruising or bleeding.       Physical Exam:  GEN: NAD  HEENT: normocephalic and atraumatic. EOMI. PERRL.    NECK: Supple.   LUNGS: CTA B/L.  HEART: RRR  ABDOMEN: Soft, NT, ND.  +BS.    : No CVA tenderness  EXTREMITIES: Without  edema.  MSK: No joint swelling  NEUROLOGIC: No Focal Deficits   PSYCHIATRIC: Appropriate affect .  SKIN: No rash      Vitals:  T(F): 97.9 (28 Sep 2022 10:42), Max: 98.9 (27 Sep 2022 23:59)  HR: 70 (28 Sep 2022 10:42)  BP: 151/71 (28 Sep 2022 10:42)  RR: 18 (28 Sep 2022 10:42)  SpO2: 95% (28 Sep 2022 10:42) (93% - 99%)  temp max in last 48H T(F): , Max: 98.9 (09-27-22 @ 23:59)      Current Antibiotics:  meropenem Injectable 1000 milliGRAM(s) IV Push every 8 hours  vancomycin  IVPB 1000 milliGRAM(s) IV Intermittent every 12 hours    Other medications:  amLODIPine   Tablet 2.5 milliGRAM(s) Oral daily  metoprolol succinate ER 50 milliGRAM(s) Oral daily  pantoprazole    Tablet 40 milliGRAM(s) Oral before breakfast                 13.4   8.66  )-----------( 230      ( 28 Sep 2022 04:52 )             40.0     09-28    136  |  101  |  13.1  ----------------------------<  81  3.7   |  24.0  |  0.68    Ca    9.4      28 Sep 2022 04:52    TPro  6.4<L>  /  Alb  3.5  /  TBili  0.7  /  DBili  0.2  /  AST  32<H>  /  ALT  136<H>  /  AlkPhos  185<H>  09-27      RECENT CULTURES:  09-26 @ 14:50 .Blood Blood     No growth to date.    09-26 @ 14:46 .Blood Blood     No growth to date.    09-24 @ 23:10 .Blood Blood-Peripheral     No growth to date.    09-24 @ 23:05 .Blood Blood-Peripheral Blood Culture PCR    Growth in anaerobic bottle: Streptococcus mitis/oralis group  Growth in aerobic bottle: Streptococcus parasanguinis  Alpha hemolytic strep  (not Strep. pneumoniae or Enterococcus)  Single set isolate, possible contaminant. Contact  Microbiology if susceptibility testing clinically  indicated.  ***Blood Panel PCR results on this specimen are available  approximately 3 hours after the Gram stain result.***  Gram stain, PCR, and/or culture results may not always  correspond due to difference in methodologies.  ************************************************************  This PCR assay was performed by multiplex PCR. This  Assay tests for 66 bacterial and resistance gene targets.  Please refer to the [facility] test directory  at [email or web address] for details.  Growth in anaerobic bottle: Gram Positive Cocci in Pairs and Chains  Growth in aerobic bottle: Gram positive cocci in pairs        WBC Count: 8.66 K/uL (09-28-22 @ 04:52)  WBC Count: 6.70 K/uL (09-27-22 @ 04:45)  WBC Count: 10.20 K/uL (09-26-22 @ 05:00)  WBC Count: 6.29 K/uL (09-25-22 @ 05:35)  WBC Count: 11.31 K/uL (09-24-22 @ 15:41)    Creatinine, Serum: 0.68 mg/dL (09-28-22 @ 04:52)  Creatinine, Serum: 0.63 mg/dL (09-27-22 @ 04:45)  Creatinine, Serum: 0.79 mg/dL (09-26-22 @ 05:00)  Creatinine, Serum: 0.72 mg/dL (09-25-22 @ 05:35)  Creatinine, Serum: 0.88 mg/dL (09-24-22 @ 15:41)    Procalcitonin, Serum: 0.31 ng/mL (09-27-22 @ 04:45)     COVID-19 PCR: NotDetec (09-26-22 @ 18:20)  SARS-CoV-2 Result: NotDetec (09-24-22 @ 15:41)        < from: CT Abdomen and Pelvis w/ IV Cont (09.25.22 @ 01:20) >  ACC: [id number] EXAM:  CT ABDOMEN AND PELVIS IC                          PROCEDURE DATE:  09/25/2022      INTERPRETATION:  CLINICAL INFORMATION: 62 years old. Female. ruq pain,   r/o choledocho.    COMPARISON: None.    CONTRAST/COMPLICATIONS:  IVContrast: Omnipaque 350  93 cc administered  7 cc discarded.  Oral Contrast: NONE  Complications: None reported at time of study completion    PROCEDURE:  CT of the Abdomen and Pelvis was performed.  Sagittal and coronal reformats were performed.    FINDINGS:    LOWER CHEST: Within normal limits.    LIVER: Multiple subcentimeter hypodensities, too small to characterize.  BILE DUCTS: Choledocholithiasis, with a 2 mm calculus in the common bile   duct, which is dilated up to 1.2 cm and abruptly narrows.  GALLBLADDER: Tiny calcified gallstones. Mild gallbladder wall   thickening/edema.  SPLEEN: Within normal limits.  PANCREAS: Within normal limits.  ADRENALS: Within normal limits.  KIDNEYS/URETERS: Enhance symmetrically. No hydronephrosis. Left renal   cyst and subcentimeter hypodensities in the right kidney, too small to   characterize.    BLADDER: Within normal limits.  REPRODUCTIVE ORGANS: Calcified fibroid.    BOWEL: No bowel obstruction. Appendix is not definitively visualized.  PERITONEUM: No ascites.  VESSELS: Normal caliber abdominal aorta. Atherosclerosis.  RETROPERITONEUM/LYMPH NODES: No lymphadenopathy.  ABDOMINAL WALL: Within normal limits.  BONES: Degenerative changes in the spine.    IMPRESSION:  Choledocholithiasis, witha 2 mm calculus in the common bile duct, which   is dilated up to 1.2 cm and abruptly narrows.    Cholelithiasis with mild gallbladder wall thickening/edema.    --- End of Report ---  < end of copied text >      
Pt seen and examined. She is s/p ERCP with sphincterotomy and balloon stone extraction yesterday with removal of CBD stone. Pt. w/o post procedure abdominal pain. For cholecystectomy today for symptomatic cholelithiasis.     REVIEW OF SYSTEMS:  Constitutional: No fever, weight loss or fatigue  Cardiovascular: No chest pain, palpitations, dizziness or leg swelling  Gastrointestinal: As noted above. No abdominal or epigastric pain. No nausea, vomiting or hematemesis; No diarrhea or constipation. No melena or hematochezia.  Skin: No itching, burning, rashes or lesions       MEDICATIONS:  MEDICATIONS  (STANDING):  amLODIPine   Tablet 2.5 milliGRAM(s) Oral daily  aspirin  chewable 81 milliGRAM(s) Oral daily  cholecalciferol 2000 Unit(s) Oral daily  indomethacin Suppository 100 milliGRAM(s) Rectal once  meropenem Injectable 1000 milliGRAM(s) IV Push every 8 hours  metoprolol succinate ER 50 milliGRAM(s) Oral daily  pantoprazole    Tablet 40 milliGRAM(s) Oral before breakfast  vancomycin  IVPB 1000 milliGRAM(s) IV Intermittent every 12 hours    MEDICATIONS  (PRN):  acetaminophen     Tablet .. 650 milliGRAM(s) Oral every 6 hours PRN Temp greater or equal to 38C (100.4F), Mild Pain (1 - 3)  aluminum hydroxide/magnesium hydroxide/simethicone Suspension 30 milliLiter(s) Oral every 4 hours PRN Dyspepsia  HYDROmorphone  Injectable 0.5 milliGRAM(s) IV Push every 4 hours PRN Moderate Pain (4 - 6)  melatonin 3 milliGRAM(s) Oral at bedtime PRN Insomnia  ondansetron Injectable 4 milliGRAM(s) IV Push every 8 hours PRN Nausea and/or Vomiting      Allergies    penicillin (Hives)  sulfa drugs (Hives)    Intolerances        Vital Signs Last 24 Hrs  T(C): 36.7 (27 Sep 2022 04:15), Max: 36.7 (26 Sep 2022 21:47)  T(F): 98.1 (27 Sep 2022 04:15), Max: 98.1 (26 Sep 2022 21:47)  HR: 69 (27 Sep 2022 04:15) (63 - 69)  BP: 148/77 (27 Sep 2022 04:15) (121/75 - 148/77)  BP(mean): --  RR: 19 (27 Sep 2022 04:15) (18 - 19)  SpO2: 95% (27 Sep 2022 04:15) (95% - 96%)    Parameters below as of 27 Sep 2022 04:15  Patient On (Oxygen Delivery Method): room air          PHYSICAL EXAM:    General: Well developed; in no acute distress  HEENT: MMM, conjunctiva pink and sclera anicteric.  Lungs: clear to auscultation bilaterally.  Cor: RRR S1, S2 only.  Gastrointestinal: Abdomen: Soft non-tender non-distended; Normal bowel sounds; No hepatosplenomegaly.  Extremities: no cyanosis, clubbing or edema.  Skin: Warm and dry. No obvious rash  Neuro: Pt. a + o x 3.    LABS:  CBC Full  -  ( 27 Sep 2022 04:45 )  WBC Count : 6.70 K/uL  RBC Count : 4.51 M/uL  Hemoglobin : 14.2 g/dL  Hematocrit : 41.1 %  Platelet Count - Automated : 220 K/uL  Mean Cell Volume : 91.1 fl  Mean Cell Hemoglobin : 31.5 pg  Mean Cell Hemoglobin Concentration : 34.5 gm/dL  Auto Neutrophil # : x  Auto Lymphocyte # : x  Auto Monocyte # : x  Auto Eosinophil # : x  Auto Basophil # : x  Auto Neutrophil % : x  Auto Lymphocyte % : x  Auto Monocyte % : x  Auto Eosinophil % : x  Auto Basophil % : x        138  |  102  |  15.2  ----------------------------<  104<H>  3.7   |  24.0  |  0.63    Ca    9.5      27 Sep 2022 04:45    TPro  6.4<L>  /  Alb  3.5  /  TBili  0.7  /  DBili  0.2  /  AST  32<H>  /  ALT  136<H>  /  AlkPhos  185<H>                        RADIOLOGY & ADDITIONAL STUDIES (The following images were personally reviewed):< from: ERCP (22 @ 00:00) >  [facility] Report        Date: 2022        Patient Name: BOGDAN HENDRICKS        MRN: [id number]        Account Number:        [id number]        Gender: Female         (age): 1960 (62)        Instrument(s):        TJF Q180V ([id number])        Attending/Fellow:        Mildred Antonio                Procedure Room #:        PROCEDURE ROOM 1                        ASA Class:        P2 - 2022 11:20 AM Mildred Antonio        Administered Medications:        As per Anesthesiology Record        Indications:        Choledocholithiasis: 574.50        Procedure:        The procedure, indications, preparation and potential complications were    explained to the patient, who indicated understanding and signed the    corresponding consent forms. IV anesthesia was administered by anesthesiologist.    Continuous pulse oximetry and blood pressure monitoring were used throughout the    procedure. Supplemental oxygen was used. Patient was placed in prone position.    The duodenoscope was introduced through mouth and advanced under direct    visualization until ampulla was reached. Patient tolerance to the procedure was    excellent. The procedure was not difficult. Blood loss was none.        Limitations/Complications:        There were no apparent limitations or complications        ERCP Findings:        The duodenoscope was passed into the second portion of the duodenum. The major    papilla was identified and appeared  to have a black stone at the orifice. Using    a short tipped traction sphincterotome, the bile duct was readily cannulated. A    wire was passed into the bile duct, bile was aspirated and a cholangiogram was    produced by injecting contrast. Using a pulsed cut setting, a sphincterotomy 8mm    was performed without complications. A 9mm and 12mm balloon were used to perform    a sweep of the bile duct. A stone was removed. The 12mm balloon was easily able    to traverse the papilla without trauma.  An occlusion cholangiogram was    performed to confirm no further filling defects. The bile duct was irrigated    with sterile water. Epinephrine was applied to the post sphincterotomy site.    There was no post  sphincterotomy bleeding.  No PD cannulation performed.        Fluoroscopic Interpretation:        I supervised the acquisition and interpretation of the fluoroscopic images at    the biliary tree. The quality of the images was good. The total fluoro time was    49 seconds.        Impressions:        Choledocholithiasis.        Summary:        ERCPwith sphincterotomy and stone removal        Plan:        Return to floor for further management        Advance diet as tolerated        Continue care per surgery team?        1L LR in PACU for 2L periprocedure total        Mildred Antonio        Version 1, Electronically signed on 2022 11:34:37 AM by Mildred Antonio    < end of copied text >  
Subjective: Patient seen and examined at bedside. Reports abdominal pain is "tolerable". No issues overnight.       MEDICATIONS  (STANDING):  amLODIPine   Tablet 2.5 milliGRAM(s) Oral daily  aspirin  chewable 81 milliGRAM(s) Oral daily  cholecalciferol 2000 Unit(s) Oral daily  indomethacin Suppository 100 milliGRAM(s) Rectal once  meropenem Injectable 1000 milliGRAM(s) IV Push every 8 hours  metoprolol succinate ER 50 milliGRAM(s) Oral daily  pantoprazole    Tablet 40 milliGRAM(s) Oral before breakfast    MEDICATIONS  (PRN):  acetaminophen     Tablet .. 650 milliGRAM(s) Oral every 6 hours PRN Temp greater or equal to 38C (100.4F), Mild Pain (1 - 3)  aluminum hydroxide/magnesium hydroxide/simethicone Suspension 30 milliLiter(s) Oral every 4 hours PRN Dyspepsia  HYDROmorphone  Injectable 0.5 milliGRAM(s) IV Push every 4 hours PRN Moderate Pain (4 - 6)  melatonin 3 milliGRAM(s) Oral at bedtime PRN Insomnia  ondansetron Injectable 4 milliGRAM(s) IV Push every 8 hours PRN Nausea and/or Vomiting      penicillin (Hives)  sulfa drugs (Hives)        Objective:     ICU Vital Signs Last 24 Hrs  T(C): 37.2 (25 Sep 2022 20:18), Max: 37.2 (25 Sep 2022 20:18)  T(F): 98.9 (25 Sep 2022 20:18), Max: 98.9 (25 Sep 2022 20:18)  HR: 63 (25 Sep 2022 20:18) (60 - 65)  BP: 129/68 (25 Sep 2022 20:18) (129/68 - 151/71)  BP(mean): --  ABP: --  ABP(mean): --  RR: 17 (25 Sep 2022 20:18) (14 - 18)  SpO2: 94% (25 Sep 2022 20:18) (94% - 98%)    O2 Parameters below as of 25 Sep 2022 20:18  Patient On (Oxygen Delivery Method): room air            I&O's Detail      Physical Exam:  General: NAD. Lying comfortably in bed.   HEENT: NCAT. Neck supple. EOMI.   Respiratory: Non labored breathing.   Cardio: RRR  Abdomen: Soft, epigastric tenderness, non-distended. No guarding or rebound.   Extremities: No clubbing or cyanosis.   Neuro: A&O x 3. CNs II-XII grossly intact.                           13.6   6.29  )-----------( 194      ( 25 Sep 2022 05:35 )             40.4       09-25    140  |  104  |  14.3  ----------------------------<  59<L>  3.5   |  20.0<L>  |  0.72    Ca    9.2      25 Sep 2022 05:35    TPro  6.3<L>  /  Alb  3.7  /  TBili  0.7  /  DBili  x   /  AST  66<H>  /  ALT  236<H>  /  AlkPhos  216<H>  09-25      LIVER FUNCTIONS - ( 25 Sep 2022 05:35 )  Alb: 3.7 g/dL / Pro: 6.3 g/dL / ALK PHOS: 216 U/L / ALT: 236 U/L / AST: 66 U/L / GGT: x               Culture - Blood (collected 24 Sep 2022 23:05)  Source: .Blood Blood-Peripheral  Gram Stain (25 Sep 2022 19:22):    Growth in anaerobic bottle: Gram Positive Cocci in Pairs and Chains  Preliminary Report (25 Sep 2022 19:22):    Growth in anaerobic bottle: Gram Positive Cocci in Pairs and Chains    ***Blood Panel PCR results on this specimen are available    approximately 3 hours after the Gram stain result.***    Gram stain, PCR, and/or culture results may not always    correspond due to difference in methodologies.    ************************************************************    This PCR assay was performed by multiplex PCR. This    Assay tests for 66 bacterial and resistance gene targets.    Please refer to the [facility] test directory    at [email or web address] for details.  Organism: Blood Culture PCR (25 Sep 2022 21:12)  Organism: Blood Culture PCR (25 Sep 2022 21:12)    
Chief complaint: Abdominal Pain    Patient seen and examined at bedside. No acute overnight events reported. No fever, chills, cough, chest pain, nausea or vomiting.     Vital Signs Last 24 Hrs  T(F): 98.1 (27 Sep 2022 04:15), Max: 98.1 (26 Sep 2022 21:47)  HR: 69 (27 Sep 2022 04:15) (63 - 69)  BP: 148/77 (27 Sep 2022 04:15) (121/75 - 148/77)  RR: 19 (27 Sep 2022 04:15) (18 - 19)  SpO2: 95% (27 Sep 2022 04:15) (95% - 96%)    Physical Exam:  Constitutional: alert and oriented, in no acute distress   Neck: Soft and supple  Respiratory: Clear to auscultation bilaterally  Cardiovascular: Regular rate and rhyhtm  Gastrointestinal: Soft, non-tender to palpation, +bs  Vascular: 2+ peripheral pulses  Musculoskeletal: no lower extremity edema bilaterally    Labs:                        14.2   6.70  )-----------( 220      ( 27 Sep 2022 04:45 )             41.1   09-27    138  |  102  |  15.2  ----------------------------<  104<H>  3.7   |  24.0  |  0.63    Ca    9.5      27 Sep 2022 04:45    TPro  6.4<L>  /  Alb  3.5  /  TBili  0.7  /  DBili  0.2  /  AST  32<H>  /  ALT  136<H>  /  AlkPhos  185<H>  09-27  
pt is s/p ERCOP  Pt to go for lap khai today  Pt explained the procedure benefits risks and alternatives   understands and agrees
Chief complaint: Abdominal Pain    Patient seen and examined at bedside. No acute overnight events reported. No fever, chills, cough, chest pain, nausea or vomiting.     Vital Signs Last 24 Hrs  T(F): 97.7 (26 Sep 2022 04:47), Max: 98.9 (25 Sep 2022 20:18)  HR: 68 (26 Sep 2022 04:47) (62 - 68)  BP: 128/85 (26 Sep 2022 04:47) (128/85 - 151/71)  RR: 18 (26 Sep 2022 04:47) (14 - 18)  SpO2: 95% (26 Sep 2022 04:47) (94% - 98%)    Physical Exam:  Constitutional: alert and oriented, in no acute distress   Neck: Soft and supple  Respiratory: Clear to auscultation bilaterally  Cardiovascular: Regular rate and rhyhtm  Gastrointestinal: Soft, non-tender to palpation, +bs  Musculoskeletal: no lower extremity edema bilaterally    Labs:                        14.5   10.20 )-----------( 229      ( 26 Sep 2022 05:00 )             41.2   09-26    139  |  103  |  14.7  ----------------------------<  85  3.5   |  24.0  |  0.79    Ca    9.4      26 Sep 2022 05:00    TPro  6.4<L>  /  Alb  3.7  /  TBili  0.6  /  DBili  x   /  AST  42<H>  /  ALT  177<H>  /  AlkPhos  198<H>  09-26

## 2022-09-28 NOTE — DISCHARGE NOTE PROVIDER - HOSPITAL COURSE
62F presented with a one week history of right upper quadrant pain associated with nausea which had acutely increased.  On presentation, WBC(11.31), AlkPhos(239), AST/ALT(100/326), Lactate(1.2). Right upper quadrant ultrasound noted dilated common bile duct measuring up to 13 mm with 2 distal common bile duct calculi, multiple calculi in dilated gallbladder, no gallbladder wall thickening, noted trace pericholecystic fluid. CT of the abdomen noted choledocholithiasis with a 2 mm calculus in the common bile duct which is dilated up to 1.2 cm and abruptly narrows, tiny calcified gallstones, mild gallbladder wall thickening/edema. The patient was seen by Surgery and gastroenterology in consultation and thought to benefit from ERCP and eventual cholecystectomy. Blood cultures grew Step mitis and Strep parasanguinis. The patient was seen by Infectious Disease in consultation and antibiotics were adjusted. The patient underwent ERCP(9/26) with sphincterotomy and stone removal. The patient underwent surgery the following day with a laparoscopic cholecystectomy. Repeat blood cultures were without growth and the original culture was thought to be contaminant as it grew multiple species. The patient was discharged home with instructions to follow up with Surgery for further management.        35 minutes total time

## 2022-09-28 NOTE — PROGRESS NOTE ADULT - ASSESSMENT
62F admitted for choledocholithaisis, possible cholecystitis. Stones visualized in US in CBD. GI consulted as patient will need ERCP and subsequent cholecystectomy once bile duct is cleared. Hx of MVR. Will continue to follow.       PLAN:    Meropenem (9/25-)  Care per medicine  1. GI plan for ERCP 9/26  2. Lap khai after ERCP
62F with a history of hypertension, GERD, and gallstones who presented with abdominal pain. Laboratory studies were notable for elevated AlkPhos and AST/ALT and abdominal ultrasound noted dilated commons bile duct with distal common bile duct calculi.    Choledocholithiasis / Cholelithiasis / Transaminitis   - Surgery and Gastroenterology consults appreciated  - Continue Meropenem 1g q8h  - ERCP and subsequent cholecystectomy    Strep bacteremia  - Blood culture positive  - Meropenem 1g q8h  - ID consulted    Hypertension  - Norvasc 2.5mg daily    GERD  - Protonix 40mg daily    DVT ppx  - SCD  
62y  Female with h/o HTN, GERD. Patient came to the ED for abdominal pain in the RUQ  radiating to the back. Started on 9/25 after eating associated with nausea, chills and sweats. Had prior episodes and had been having  intermittent RUQ abdominal pain for the week prior. She noted first episode in 2020; saw her PCP and was told she had gall stone but not need to take out the gall bladder. In 2021 during thanksgiving she had another attack, did not seek any medical attention. In the ER patient was afebrile with leukocytosis to 11k. CT A/P reporting Choledocholithiasis. Started on Meropenem. Blood cultures with Streptococcus. s/p ERCP 9/26.      Positive blood culture  Choledocholithiasis s/p ERCP 9/26  Leukocytosis   PCN allergy       - Blood cultures 9/24 reporting Streptococcus 2 different sp. likely contamination   - Repeat blood cultures 9/26 no growth   - CT A/P reporting Choledocholithiasis   - PCN allergy was when she was a child, and had hives, tolerating Meropenem   - D/C Vancomycin  - Trend Fever  - Trend WBC      Will Sign off. please call PRN.       d/w Dr Marquez       
Symptomatic cholelithiasis with choledocholithiasis s/p successful ERCP yesterday with removal of CBD stone. No c/o post procedure abdominal pain. For cholecystectomy today. No plans or need for continued GI f/u. Signing off. Reconsult as needed. Thank you.
62F with a history of hypertension, GERD, and gallstones who presented with abdominal pain. Laboratory studies were notable for elevated AlkPhos and AST/ALT and abdominal ultrasound noted dilated commons bile duct with distal common bile duct calculi.    Choledocholithiasis / Cholelithiasis / Transaminitis   - Surgery and Gastroenterology consults appreciated  - Continue Meropenem 1g q8h x7 days  - s/p ERCP  - Lap khai today with surgery     Strep bacteremia  - Blood culture positive  - ID consult appreciated  - Vancomycin 1g q12h    Hypertension  - Norvasc 2.5mg daily    GERD  - Protonix 40mg daily    DVT ppx  - SCD

## 2022-09-28 NOTE — PROGRESS NOTE ADULT - PROVIDER SPECIALTY LIST ADULT
TeleCardiology
Hospitalist
Anesthesia
Gastroenterology
Hospitalist
Infectious Disease
Surgery
Surgery

## 2022-09-28 NOTE — DISCHARGE NOTE PROVIDER - NSDCCPCAREPLAN_GEN_ALL_CORE_FT
PRINCIPAL DISCHARGE DIAGNOSIS  Diagnosis: Choledocholithiasis  Assessment and Plan of Treatment: Status post ERCP and cholecystectomy. Follow up with Surgery for further management.      SECONDARY DISCHARGE DIAGNOSES  Diagnosis: Hypertension  Assessment and Plan of Treatment: Continue with your home antihypertensive medications.

## 2022-09-28 NOTE — DISCHARGE NOTE PROVIDER - CARE PROVIDER_API CALL
Syed Singh)  Surgery  [address], [address]  [place] [address]  Phone: [phone]  Fax: [phone]  Follow Up Time:

## 2022-09-28 NOTE — DISCHARGE NOTE PROVIDER - NSDCMRMEDTOKEN_GEN_ALL_CORE_FT
amLODIPine 2.5 mg oral tablet: 1 tab(s) orally once a day  aspirin 81 mg oral tablet, chewable: 1 tab(s) orally once a day  metoprolol succinate 50 mg oral tablet, extended release: 1 tab(s) orally once a day  omeprazole 40 mg oral delayed release capsule: 1 cap(s) orally once a day  Vitamin D3 2000 intl units oral tablet: 1 tab(s) orally once a day

## 2022-09-28 NOTE — DISCHARGE NOTE NURSING/CASE MANAGEMENT/SOCIAL WORK - NSDCPEFALRISK_GEN_ALL_CORE
For information on Fall & Injury Prevention, visit: [email or web address] OR  [email or web address] OR  https://www.cdc.gov/steadi/patient.html

## 2022-09-30 LAB
CULTURE RESULTS: SIGNIFICANT CHANGE UP
SPECIMEN SOURCE: SIGNIFICANT CHANGE UP
SURGICAL PATHOLOGY STUDY: SIGNIFICANT CHANGE UP

## 2022-10-01 LAB
CULTURE RESULTS: SIGNIFICANT CHANGE UP
CULTURE RESULTS: SIGNIFICANT CHANGE UP
SPECIMEN SOURCE: SIGNIFICANT CHANGE UP
SPECIMEN SOURCE: SIGNIFICANT CHANGE UP

## 2022-11-05 NOTE — ED STATDOCS - NS_BEDUNITTYPES_ED_ALL_ED
FATOU ANY BED

## 2023-03-23 NOTE — PATIENT PROFILE ADULT - TRANSPORTATION
The following findings require follow up:  Radiographic finding   Finding: Incidental diffuse dilated fluid-filled esophagus with moderate hiatal hernia     Follow up required: family doctor   Follow up should be done within 2 week(s) no

## 2024-06-14 PROBLEM — Z00.00 ENCOUNTER FOR PREVENTIVE HEALTH EXAMINATION: Status: ACTIVE | Noted: 2024-06-14

## 2024-06-17 ENCOUNTER — APPOINTMENT (OUTPATIENT)
Dept: ORTHOPEDIC SURGERY | Facility: CLINIC | Age: 64
End: 2024-06-17
Payer: COMMERCIAL

## 2024-06-17 VITALS — BODY MASS INDEX: 20.04 KG/M2 | HEIGHT: 70 IN | WEIGHT: 140 LBS

## 2024-06-17 DIAGNOSIS — Z82.49 FAMILY HISTORY OF ISCHEMIC HEART DISEASE AND OTHER DISEASES OF THE CIRCULATORY SYSTEM: ICD-10-CM

## 2024-06-17 DIAGNOSIS — M41.9 SCOLIOSIS, UNSPECIFIED: ICD-10-CM

## 2024-06-17 DIAGNOSIS — I10 ESSENTIAL (PRIMARY) HYPERTENSION: ICD-10-CM

## 2024-06-17 DIAGNOSIS — Z87.19 PERSONAL HISTORY OF OTHER DISEASES OF THE DIGESTIVE SYSTEM: ICD-10-CM

## 2024-06-17 DIAGNOSIS — I51.9 HEART DISEASE, UNSPECIFIED: ICD-10-CM

## 2024-06-17 PROCEDURE — 99204 OFFICE O/P NEW MOD 45 MIN: CPT

## 2024-06-17 RX ORDER — AMLODIPINE BESYLATE 5 MG/1
5 TABLET ORAL
Refills: 0 | Status: ACTIVE | COMMUNITY

## 2024-06-17 RX ORDER — METOPROLOL TARTRATE 75 MG/1
TABLET, FILM COATED ORAL
Refills: 0 | Status: ACTIVE | COMMUNITY

## 2024-06-17 RX ORDER — OMEPRAZOLE 20 MG/1
TABLET, ORALLY DISINTEGRATING, DELAYED RELEASE ORAL
Refills: 0 | Status: ACTIVE | COMMUNITY

## 2024-06-17 RX ORDER — ASPIRIN 81 MG
81 TABLET,CHEWABLE ORAL
Refills: 0 | Status: ACTIVE | COMMUNITY

## 2024-06-22 NOTE — DATA REVIEWED
[Outside X-rays] : outside x-rays [Report was reviewed and noted in the chart] : The report was reviewed and noted in the chart [I independently reviewed and interpreted images and report] : I independently reviewed and interpreted images and report [I reviewed the films/CD] : I reviewed the films/CD [FreeTextEntry1] : I stop paperwork reviewed

## 2024-06-22 NOTE — DISCUSSION/SUMMARY
[de-identified] : Together in the office, we reviewed her x-ray imaging and current diagnosis of degenerative scoliosis and degenerative disc disease.  Recommend outpatient physical therapy focus on core muscle strengthening range of motion, proper body mechanics and flexibility. Would also recommend advanced imaging lumbar spine with MRI to evaluate for disc herniation, lumbar stenosis or facet the generation given the single level space collapse noted at L3/4.  Patient may be a candidate for interventional pain management, depending on response to physical therapy and review of MRI. Cumulative encounter duration exceeded 45 minutes.  Prior to appointment and during encounter with patient extensive medical records were reviewed including but not limited to, hospital records, out patient records, imaging results, and lab data. During this appointment the patient was examined, diagnoses were discussed and explained in a face to face manner. In addition extensive time was spent reviewing aforementioned diagnostic studies. Counseling including abnormal image results, differential diagnoses, treatment options, risk and benefits, lifestyle changes, current condition, and current medications was performed. Patient's comments, questions, and concerns were address and patient verbalized understanding. Based on this patient's presentation at our office, which is an orthopedic spine surgeon's office, this patient inherently / intrinsically has a risk, however minute, of developing issues such as Cauda equina syndrome, bowel and bladder changes, or progression of motor or neurological deficits such as paralysis which may be permanent.   NELLIE RINALDI acting as a Scribe for Dr. Good MAHAJAN, Nellie Rinaldi, attest that this documentation has been prepared under the direction and in the presence of Provider Clint Funk MD.

## 2024-06-22 NOTE — PHYSICAL EXAM
[Normal Coordination] : normal coordination [Normal DTR UE/LE] : normal DTR UE/LE  [Normal Sensation] : normal sensation [Normal Mood and Affect] : normal mood and affect [Oriented] : oriented [Able to Communicate] : able to communicate [Normal Skin] : normal skin [No Rash] : no rash [No Ulcers] : no ulcers [No Lesions] : no lesions [No obvious lymphadenopathy in areas examined] : no obvious lymphadenopathy in areas examined [Well Developed] : well developed [Peripheral vascular exam is grossly normal] : peripheral vascular exam is grossly normal [No Respiratory Distress] : no respiratory distress [de-identified] : Constitutional: - General Appearance: Unremarkable Body Habitus Well Developed Well Nourished Body Habitus No Deformities Well Groomed Ability To communicate: Normal Neurologic: Global sensation is intact to upper and lower extremities. See examination of Neck and/or Spine for exceptions. Orientation to Time, Place and Person is: Normal Mood And Affect is Normal Skin: - Head/Face, Right Upper/Lower Extremity, Left Upper/Lower Extremity: Normal See Examination of Neck and/or Spine for exceptions Cardiovascular: Peripheral Cardiovascular System is Normal Palpation of Lymph Nodes: Normal Palpation of lymph nodes in: Axilla, Cervical, Inguinal Abnormal Palpation of lymph nodes in: None  [] : negative equivocal straight leg raise [FreeTextEntry9] : Painful lumbar range of motion, particularly with extension and rotation. Scoliotic deformity noted in the lumbar spine.

## 2024-06-22 NOTE — HISTORY OF PRESENT ILLNESS
[Lower back] : lower back [Result of repetitive motion] : result of repetitive motion [4] : 4 [3] : 3 [Localized] : localized [Shooting] : shooting [Squeezing] : squeezing [Household chores] : household chores [Leisure] : leisure [Work] : work [Social interactions] : social interactions [Nothing helps with pain getting better] : Nothing helps with pain getting better [Bending forward] : bending forward [Stairs] : stairs [Full time] : Work status: full time [de-identified] : 06/17/2024: Patient presents to the office today for initial evaluation of lower back pain. Report several year history of central lower back pain without numbness, tingling or pain radiating into the lower extremities. Pain seems to be worse with bending and transitioning. Better if she is sitting comfortably. She has, not investigated physical therapy yet  Patient does recall having an injury to her back while caring for some of her dogs that needed help up and down the stairs. She also did suffer bilateral wrist fractures, and was diagnosed with osteopenia.  [] : no [FreeTextEntry5] : Low back pain since Fall 2023. pt states possibly from helping her 2 dogs down the stairs.  [FreeTextEntry6] : feels "pressure" [FreeTextEntry9] : does use a heating pad every PM , aspirin  [de-identified] : any pulling motion, bending , lifting and twisting  [de-identified] :

## 2024-07-29 ENCOUNTER — APPOINTMENT (OUTPATIENT)
Dept: ORTHOPEDIC SURGERY | Facility: CLINIC | Age: 64
End: 2024-07-29
Payer: COMMERCIAL

## 2024-07-29 VITALS — HEIGHT: 70 IN | BODY MASS INDEX: 20.04 KG/M2 | WEIGHT: 140 LBS

## 2024-07-29 DIAGNOSIS — M41.9 SCOLIOSIS, UNSPECIFIED: ICD-10-CM

## 2024-07-29 PROCEDURE — 99213 OFFICE O/P EST LOW 20 MIN: CPT

## 2024-08-01 NOTE — DISCUSSION/SUMMARY
[de-identified] : MRI of the lumbar spine is requested to evaluate for disc herniation, lumbar stenosis or facet degeneration given patients persistent back pain despite  physical therapy and nsaid usage. She may be a candidate for interventional pain management in terms of interventional injections although this will be determined upon review of the MRI. F/u after mri   Prior to appointment and during encounter with patient extensive medical records were reviewed including but not limited to, hospital records, out patient records, imaging results, and lab data. During this appointment the patient was examined, diagnoses were discussed and explained in a face to face manner. In addition extensive time was spent reviewing aforementioned diagnostic studies. Counseling including abnormal image results, differential diagnoses, treatment options, risk and benefits, lifestyle changes, current condition, and current medications was performed. Patient's comments, questions, and concerns were address and patient verbalized understanding. Based on this patient's presentation at our office, which is an orthopedic spine surgeon's office, this patient inherently / intrinsically has a risk, however minute, of developing issues such as Cauda equina syndrome, bowel and bladder changes, or progression of motor or neurological deficits such as paralysis which may be permanent.   CANDACE BLEDSOE Acting as a Scribe for Candace Drake, attest that this documentation has been prepared under the direction and in the presence of Provider Clint Funk MD.

## 2024-08-01 NOTE — PHYSICAL EXAM
[Normal Coordination] : normal coordination [Normal DTR UE/LE] : normal DTR UE/LE  [Normal Sensation] : normal sensation [Normal Mood and Affect] : normal mood and affect [Oriented] : oriented [Able to Communicate] : able to communicate [Normal Skin] : normal skin [No Rash] : no rash [No Ulcers] : no ulcers [No Lesions] : no lesions [No obvious lymphadenopathy in areas examined] : no obvious lymphadenopathy in areas examined [Well Developed] : well developed [Peripheral vascular exam is grossly normal] : peripheral vascular exam is grossly normal [No Respiratory Distress] : no respiratory distress [de-identified] : Constitutional: - General Appearance: Unremarkable Body Habitus Well Developed Well Nourished Body Habitus No Deformities Well Groomed Ability To communicate: Normal Neurologic: Global sensation is intact to upper and lower extremities. See examination of Neck and/or Spine for exceptions. Orientation to Time, Place and Person is: Normal Mood And Affect is Normal Skin: - Head/Face, Right Upper/Lower Extremity, Left Upper/Lower Extremity: Normal See Examination of Neck and/or Spine for exceptions Cardiovascular: Peripheral Cardiovascular System is Normal Palpation of Lymph Nodes: Normal Palpation of lymph nodes in: Axilla, Cervical, Inguinal Abnormal Palpation of lymph nodes in: None  [] : non-antalgic [FreeTextEntry9] : Painful lumbar range of motion, particularly with extension and rotation. Scoliotic deformity noted in the lumbar spine.

## 2024-08-01 NOTE — PHYSICAL EXAM
[Normal Coordination] : normal coordination [Normal DTR UE/LE] : normal DTR UE/LE  [Normal Sensation] : normal sensation [Normal Mood and Affect] : normal mood and affect [Oriented] : oriented [Able to Communicate] : able to communicate [Normal Skin] : normal skin [No Rash] : no rash [No Ulcers] : no ulcers [No Lesions] : no lesions [No obvious lymphadenopathy in areas examined] : no obvious lymphadenopathy in areas examined [Well Developed] : well developed [Peripheral vascular exam is grossly normal] : peripheral vascular exam is grossly normal [No Respiratory Distress] : no respiratory distress [de-identified] : Constitutional: - General Appearance: Unremarkable Body Habitus Well Developed Well Nourished Body Habitus No Deformities Well Groomed Ability To communicate: Normal Neurologic: Global sensation is intact to upper and lower extremities. See examination of Neck and/or Spine for exceptions. Orientation to Time, Place and Person is: Normal Mood And Affect is Normal Skin: - Head/Face, Right Upper/Lower Extremity, Left Upper/Lower Extremity: Normal See Examination of Neck and/or Spine for exceptions Cardiovascular: Peripheral Cardiovascular System is Normal Palpation of Lymph Nodes: Normal Palpation of lymph nodes in: Axilla, Cervical, Inguinal Abnormal Palpation of lymph nodes in: None  [] : non-antalgic [FreeTextEntry9] : Painful lumbar range of motion, particularly with extension and rotation. Scoliotic deformity noted in the lumbar spine.

## 2024-08-01 NOTE — DISCUSSION/SUMMARY
I sent the patient a [employer] message asking her to call and schedule an appointment. Will monitor that the message is read and the patient calls to schedule an appt.   [de-identified] : MRI of the lumbar spine is requested to evaluate for disc herniation, lumbar stenosis or facet degeneration given patients persistent back pain despite  physical therapy and nsaid usage. She may be a candidate for interventional pain management in terms of interventional injections although this will be determined upon review of the MRI. F/u after mri   Prior to appointment and during encounter with patient extensive medical records were reviewed including but not limited to, hospital records, out patient records, imaging results, and lab data. During this appointment the patient was examined, diagnoses were discussed and explained in a face to face manner. In addition extensive time was spent reviewing aforementioned diagnostic studies. Counseling including abnormal image results, differential diagnoses, treatment options, risk and benefits, lifestyle changes, current condition, and current medications was performed. Patient's comments, questions, and concerns were address and patient verbalized understanding. Based on this patient's presentation at our office, which is an orthopedic spine surgeon's office, this patient inherently / intrinsically has a risk, however minute, of developing issues such as Cauda equina syndrome, bowel and bladder changes, or progression of motor or neurological deficits such as paralysis which may be permanent.   CANDACE BLEDSOE Acting as a Scribe for Candace Drake, attest that this documentation has been prepared under the direction and in the presence of Provider Clint Funk MD.

## 2024-08-01 NOTE — HISTORY OF PRESENT ILLNESS
[6] : 6 [3] : 3 [Full time] : Work status: full time [de-identified] : 07/29/2024 - Patient presents for follow-up with worsening low back pain since the last visit. Reports that physical therapy was not helpful and mentions that the pain is focal to the low back without radicular leg pain. Aspirin has provided no significant relief.  06/17/2024: Patient presents to the office today for initial evaluation of lower back pain. Report several year history of central lower back pain without numbness, tingling or pain radiating into the lower extremities. Pain seems to be worse with bending and transitioning. Better if she is sitting comfortably. She has, not investigated physical therapy yet  Patient does recall having an injury to her back while caring for some of her dogs that needed help up and down the stairs. She also did suffer bilateral wrist fractures, and was diagnosed with osteopenia.  [de-identified] : p/t

## 2024-08-01 NOTE — HISTORY OF PRESENT ILLNESS
[6] : 6 [3] : 3 [Full time] : Work status: full time [de-identified] : 07/29/2024 - Patient presents for follow-up with worsening low back pain since the last visit. Reports that physical therapy was not helpful and mentions that the pain is focal to the low back without radicular leg pain. Aspirin has provided no significant relief.  06/17/2024: Patient presents to the office today for initial evaluation of lower back pain. Report several year history of central lower back pain without numbness, tingling or pain radiating into the lower extremities. Pain seems to be worse with bending and transitioning. Better if she is sitting comfortably. She has, not investigated physical therapy yet  Patient does recall having an injury to her back while caring for some of her dogs that needed help up and down the stairs. She also did suffer bilateral wrist fractures, and was diagnosed with osteopenia.  [de-identified] : p/t

## 2024-08-20 ENCOUNTER — APPOINTMENT (OUTPATIENT)
Dept: MRI IMAGING | Facility: CLINIC | Age: 64
End: 2024-08-20
Payer: COMMERCIAL

## 2024-08-20 PROCEDURE — 72148 MRI LUMBAR SPINE W/O DYE: CPT

## 2024-09-16 ENCOUNTER — APPOINTMENT (OUTPATIENT)
Dept: ORTHOPEDIC SURGERY | Facility: CLINIC | Age: 64
End: 2024-09-16
Payer: COMMERCIAL

## 2024-09-16 VITALS — WEIGHT: 140 LBS | BODY MASS INDEX: 20.04 KG/M2 | HEIGHT: 70 IN

## 2024-09-16 DIAGNOSIS — M41.9 SCOLIOSIS, UNSPECIFIED: ICD-10-CM

## 2024-09-16 DIAGNOSIS — M47.816 SPONDYLOSIS W/OUT MYELOPATHY OR RADICULOPATHY, LUMBAR REGION: ICD-10-CM

## 2024-09-16 PROCEDURE — 99214 OFFICE O/P EST MOD 30 MIN: CPT

## 2024-09-21 NOTE — PHYSICAL EXAM
[Normal Coordination] : normal coordination [Normal DTR UE/LE] : normal DTR UE/LE  [Normal Sensation] : normal sensation [Normal Mood and Affect] : normal mood and affect [Oriented] : oriented [Able to Communicate] : able to communicate [Normal Skin] : normal skin [No Rash] : no rash [No Ulcers] : no ulcers [No Lesions] : no lesions [No obvious lymphadenopathy in areas examined] : no obvious lymphadenopathy in areas examined [Well Developed] : well developed [Peripheral vascular exam is grossly normal] : peripheral vascular exam is grossly normal [No Respiratory Distress] : no respiratory distress [de-identified] : Constitutional: - General Appearance: Unremarkable Body Habitus Well Developed Well Nourished Body Habitus No Deformities Well Groomed Ability To communicate: Normal Neurologic: Global sensation is intact to upper and lower extremities. See examination of Neck and/or Spine for exceptions. Orientation to Time, Place and Person is: Normal Mood And Affect is Normal Skin: - Head/Face, Right Upper/Lower Extremity, Left Upper/Lower Extremity: Normal See Examination of Neck and/or Spine for exceptions Cardiovascular: Peripheral Cardiovascular System is Normal Palpation of Lymph Nodes: Normal Palpation of lymph nodes in: Axilla, Cervical, Inguinal Abnormal Palpation of lymph nodes in: None  [] : non-antalgic [FreeTextEntry9] : Painful lumbar range of motion, particularly with extension and rotation. Scoliotic deformity noted in the lumbar spine.

## 2024-09-21 NOTE — DISCUSSION/SUMMARY
[de-identified] : MRI reveals degenerative lumbar scoliosis L2/3. modic changes noted L2/3.  No sacrum or pelvic fractures.  Discussed conservative treatments in the form of proper body mechanics, home core strengthening exercises, heat as needed, NSAID PRN. I am referring the patient to pain management to discuss trying interventional injections/Intracept  for pain relief. F/u 6 weeks.  Cumulative encounter duration exceeded 30 minutes  We have discussed the risks, benefits, and alternatives NSAID therapy including but not limited to the risk of bleeding, thrombosis, gastric mucosal irritation/ulceration, allergic reaction and kidney dysfunction; the patient verbalizes an understanding.   Prior to appointment and during encounter with patient extensive medical records were reviewed including but not limited to, hospital records, out patient records, imaging results, and lab data. During this appointment the patient was examined, diagnoses were discussed and explained in a face to face manner. In addition extensive time was spent reviewing aforementioned diagnostic studies. Counseling including abnormal image results, differential diagnoses, treatment options, risk and benefits, lifestyle changes, current condition, and current medications was performed. Patient's comments, questions, and concerns were address and patient verbalized understanding. Based on this patient's presentation at our office, which is an orthopedic spine surgeon's office, this patient inherently / intrinsically has a risk, however minute, of developing issues such as Cauda equina syndrome, bowel and bladder changes, or progression of motor or neurological deficits such as paralysis which may be permanent.   CANDACE BLEDSOE Acting as a Scribe for Candace Drake, attest that this documentation has been prepared under the direction and in the presence of Provider Clint Funk MD.

## 2024-09-21 NOTE — DATA REVIEWED
[MRI] : MRI [Report was reviewed and noted in the chart] : The report was reviewed and noted in the chart [I independently reviewed and interpreted images and report] : I independently reviewed and interpreted images and report [I reviewed the films/CD and additionally noted] : I reviewed the films/CD and additionally noted [FreeTextEntry1] : I stop paperwork reviewed

## 2024-09-21 NOTE — HISTORY OF PRESENT ILLNESS
[Lower back] : lower back [7] : 7 [3] : 3 [Burning] : burning [Intermittent] : intermittent [Household chores] : household chores [Rest] : rest [Walking] : walking [de-identified] : 09/16/2024 - Patient presenting to review mri. She complains of focal left sided low back/buttock pain. No radicular leg pain. Taking Aspirin for symptom control. Pain described as constant with severity ranging. Pain worsens with activity.   07/29/2024 - Patient presents for follow-up with worsening low back pain since the last visit. Reports that physical therapy was not helpful and mentions that the pain is focal to the low back without radicular leg pain. Aspirin has provided no significant relief.  06/17/2024: Patient presents to the office today for initial evaluation of lower back pain. Report several year history of central lower back pain without numbness, tingling or pain radiating into the lower extremities. Pain seems to be worse with bending and transitioning. Better if she is sitting comfortably. She has, not investigated physical therapy yet  Patient does recall having an injury to her back while caring for some of her dogs that needed help up and down the stairs. She also did suffer bilateral wrist fractures, and was diagnosed with osteopenia.  [] : no [FreeTextEntry7] : Left buttock [de-identified] : MRI [facility]

## 2024-09-21 NOTE — PHYSICAL EXAM
[Normal Coordination] : normal coordination [Normal DTR UE/LE] : normal DTR UE/LE  [Normal Sensation] : normal sensation [Normal Mood and Affect] : normal mood and affect [Oriented] : oriented [Able to Communicate] : able to communicate [Normal Skin] : normal skin [No Rash] : no rash [No Ulcers] : no ulcers [No Lesions] : no lesions [No obvious lymphadenopathy in areas examined] : no obvious lymphadenopathy in areas examined [Well Developed] : well developed [Peripheral vascular exam is grossly normal] : peripheral vascular exam is grossly normal [No Respiratory Distress] : no respiratory distress [de-identified] : Constitutional: - General Appearance: Unremarkable Body Habitus Well Developed Well Nourished Body Habitus No Deformities Well Groomed Ability To communicate: Normal Neurologic: Global sensation is intact to upper and lower extremities. See examination of Neck and/or Spine for exceptions. Orientation to Time, Place and Person is: Normal Mood And Affect is Normal Skin: - Head/Face, Right Upper/Lower Extremity, Left Upper/Lower Extremity: Normal See Examination of Neck and/or Spine for exceptions Cardiovascular: Peripheral Cardiovascular System is Normal Palpation of Lymph Nodes: Normal Palpation of lymph nodes in: Axilla, Cervical, Inguinal Abnormal Palpation of lymph nodes in: None  [] : non-antalgic [FreeTextEntry9] : Painful lumbar range of motion, particularly with extension and rotation. Scoliotic deformity noted in the lumbar spine.

## 2024-09-21 NOTE — HISTORY OF PRESENT ILLNESS
[Lower back] : lower back [7] : 7 [3] : 3 [Burning] : burning [Intermittent] : intermittent [Household chores] : household chores [Rest] : rest [Walking] : walking [de-identified] : 09/16/2024 - Patient presenting to review mri. She complains of focal left sided low back/buttock pain. No radicular leg pain. Taking Aspirin for symptom control. Pain described as constant with severity ranging. Pain worsens with activity.   07/29/2024 - Patient presents for follow-up with worsening low back pain since the last visit. Reports that physical therapy was not helpful and mentions that the pain is focal to the low back without radicular leg pain. Aspirin has provided no significant relief.  06/17/2024: Patient presents to the office today for initial evaluation of lower back pain. Report several year history of central lower back pain without numbness, tingling or pain radiating into the lower extremities. Pain seems to be worse with bending and transitioning. Better if she is sitting comfortably. She has, not investigated physical therapy yet  Patient does recall having an injury to her back while caring for some of her dogs that needed help up and down the stairs. She also did suffer bilateral wrist fractures, and was diagnosed with osteopenia.  [] : no [FreeTextEntry7] : Left buttock [de-identified] : MRI [facility]

## 2024-09-21 NOTE — DISCUSSION/SUMMARY
[de-identified] : MRI reveals degenerative lumbar scoliosis L2/3. modic changes noted L2/3.  No sacrum or pelvic fractures.  Discussed conservative treatments in the form of proper body mechanics, home core strengthening exercises, heat as needed, NSAID PRN. I am referring the patient to pain management to discuss trying interventional injections/Intracept  for pain relief. F/u 6 weeks.  Cumulative encounter duration exceeded 30 minutes  We have discussed the risks, benefits, and alternatives NSAID therapy including but not limited to the risk of bleeding, thrombosis, gastric mucosal irritation/ulceration, allergic reaction and kidney dysfunction; the patient verbalizes an understanding.   Prior to appointment and during encounter with patient extensive medical records were reviewed including but not limited to, hospital records, out patient records, imaging results, and lab data. During this appointment the patient was examined, diagnoses were discussed and explained in a face to face manner. In addition extensive time was spent reviewing aforementioned diagnostic studies. Counseling including abnormal image results, differential diagnoses, treatment options, risk and benefits, lifestyle changes, current condition, and current medications was performed. Patient's comments, questions, and concerns were address and patient verbalized understanding. Based on this patient's presentation at our office, which is an orthopedic spine surgeon's office, this patient inherently / intrinsically has a risk, however minute, of developing issues such as Cauda equina syndrome, bowel and bladder changes, or progression of motor or neurological deficits such as paralysis which may be permanent.   CANDACE BLEDSOE Acting as a Scribe for Candace Drake, attest that this documentation has been prepared under the direction and in the presence of Provider Clint Funk MD.

## 2024-10-28 ENCOUNTER — APPOINTMENT (OUTPATIENT)
Dept: PAIN MANAGEMENT | Facility: CLINIC | Age: 64
End: 2024-10-28
Payer: COMMERCIAL

## 2024-10-28 VITALS — HEIGHT: 70 IN | WEIGHT: 142 LBS | BODY MASS INDEX: 20.33 KG/M2

## 2024-10-28 DIAGNOSIS — M51.360: ICD-10-CM

## 2024-10-28 DIAGNOSIS — M41.9 SCOLIOSIS, UNSPECIFIED: ICD-10-CM

## 2024-10-28 DIAGNOSIS — M54.16 RADICULOPATHY, LUMBAR REGION: ICD-10-CM

## 2024-10-28 DIAGNOSIS — M47.816 SPONDYLOSIS W/OUT MYELOPATHY OR RADICULOPATHY, LUMBAR REGION: ICD-10-CM

## 2024-10-28 PROCEDURE — 99204 OFFICE O/P NEW MOD 45 MIN: CPT

## 2024-11-18 ENCOUNTER — APPOINTMENT (OUTPATIENT)
Dept: ORTHOPEDIC SURGERY | Facility: CLINIC | Age: 64
End: 2024-11-18

## 2024-11-20 NOTE — BRIEF OPERATIVE NOTE - NSICDXBRIEFPREOP_GEN_ALL_CORE_FT
PRE-OP DIAGNOSIS:  Acute cholecystitis due to biliary calculus 27-Sep-2022 15:09:10  Natanael Schroeder   Home PT

## (undated) DEVICE — SSH-ERCP SCOPE TJF Q 180 V 2203371: Type: DURABLE MEDICAL EQUIPMENT

## (undated) DEVICE — ELCTR GROUNDING PAD ADULT COVIDIEN

## (undated) DEVICE — BLANKET WARMER UPPER ADULT

## (undated) DEVICE — WRAP COMPRESSION CALF MED

## (undated) DEVICE — TROCAR COVIDIEN VERSAONE BLUNT TIP HASSAN 12MM

## (undated) DEVICE — TROCAR COVIDIEN VERSAONE OPTICAL BLADELESS 5MM

## (undated) DEVICE — SUT VICRYL 0 27" CT-2 UNDYED

## (undated) DEVICE — SYR CONTROL LUER LOK 10CC

## (undated) DEVICE — SUT MONOCRYL 4-0 27" PS-2 UNDYED

## (undated) DEVICE — LIGASURE MARYLAND JAW LAPAROSCOPIC SEALER 37CM

## (undated) DEVICE — GLV 7.5 PROTEXIS

## (undated) DEVICE — VALVE ENDOSCOPE DEFENDO SINGLE USE

## (undated) DEVICE — PACK GENERAL LAPAROSCOPY

## (undated) DEVICE — SSH-ERBE RM1 11351341: Type: DURABLE MEDICAL EQUIPMENT

## (undated) DEVICE — ELCTR CORD FOOTSWITCH 1PLR LAPSCP 10FT

## (undated) DEVICE — ENDOCATCH 10MM SPECIMEN POUCH

## (undated) DEVICE — TUBING STRYKEFLOW II SUCTION / IRRIGATOR